# Patient Record
Sex: FEMALE | Race: WHITE | Employment: FULL TIME | URBAN - METROPOLITAN AREA
[De-identification: names, ages, dates, MRNs, and addresses within clinical notes are randomized per-mention and may not be internally consistent; named-entity substitution may affect disease eponyms.]

---

## 2017-04-04 ENCOUNTER — OFFICE VISIT (OUTPATIENT)
Dept: FAMILY MEDICINE CLINIC | Age: 27
End: 2017-04-04

## 2017-04-04 VITALS
OXYGEN SATURATION: 98 % | TEMPERATURE: 97.9 F | HEIGHT: 59 IN | DIASTOLIC BLOOD PRESSURE: 72 MMHG | BODY MASS INDEX: 31.45 KG/M2 | RESPIRATION RATE: 16 BRPM | HEART RATE: 86 BPM | SYSTOLIC BLOOD PRESSURE: 120 MMHG | WEIGHT: 156 LBS

## 2017-04-04 DIAGNOSIS — R11.0 NAUSEA: ICD-10-CM

## 2017-04-04 DIAGNOSIS — K29.70 GASTRITIS, PRESENCE OF BLEEDING UNSPECIFIED, UNSPECIFIED CHRONICITY, UNSPECIFIED GASTRITIS TYPE: ICD-10-CM

## 2017-04-04 DIAGNOSIS — R10.84 GENERALIZED ABDOMINAL PAIN: ICD-10-CM

## 2017-04-04 DIAGNOSIS — R10.84 GENERALIZED ABDOMINAL PAIN: Primary | ICD-10-CM

## 2017-04-04 LAB
A/G RATIO: 1.5 (ref 1.1–2.2)
ALBUMIN SERPL-MCNC: 4.4 G/DL (ref 3.4–5)
ALP BLD-CCNC: 66 U/L (ref 40–129)
ALT SERPL-CCNC: 9 U/L (ref 10–40)
ANION GAP SERPL CALCULATED.3IONS-SCNC: 15 MMOL/L (ref 3–16)
AST SERPL-CCNC: 13 U/L (ref 15–37)
BASOPHILS ABSOLUTE: 0.1 K/UL (ref 0–0.2)
BASOPHILS RELATIVE PERCENT: 1.2 %
BILIRUB SERPL-MCNC: <0.2 MG/DL (ref 0–1)
BUN BLDV-MCNC: 13 MG/DL (ref 7–20)
CALCIUM SERPL-MCNC: 9 MG/DL (ref 8.3–10.6)
CHLORIDE BLD-SCNC: 104 MMOL/L (ref 99–110)
CO2: 24 MMOL/L (ref 21–32)
CREAT SERPL-MCNC: 0.6 MG/DL (ref 0.6–1.1)
EOSINOPHILS ABSOLUTE: 0.2 K/UL (ref 0–0.6)
EOSINOPHILS RELATIVE PERCENT: 2.8 %
GFR AFRICAN AMERICAN: >60
GFR NON-AFRICAN AMERICAN: >60
GLOBULIN: 2.9 G/DL
GLUCOSE BLD-MCNC: 91 MG/DL (ref 70–99)
HCT VFR BLD CALC: 40.7 % (ref 36–48)
HEMOGLOBIN: 13.4 G/DL (ref 12–16)
LYMPHOCYTES ABSOLUTE: 2.3 K/UL (ref 1–5.1)
LYMPHOCYTES RELATIVE PERCENT: 31 %
MCH RBC QN AUTO: 27.6 PG (ref 26–34)
MCHC RBC AUTO-ENTMCNC: 32.9 G/DL (ref 31–36)
MCV RBC AUTO: 84.1 FL (ref 80–100)
MONOCYTES ABSOLUTE: 0.6 K/UL (ref 0–1.3)
MONOCYTES RELATIVE PERCENT: 8.2 %
NEUTROPHILS ABSOLUTE: 4.2 K/UL (ref 1.7–7.7)
NEUTROPHILS RELATIVE PERCENT: 56.8 %
PDW BLD-RTO: 12.7 % (ref 12.4–15.4)
PLATELET # BLD: 301 K/UL (ref 135–450)
PMV BLD AUTO: 8.3 FL (ref 5–10.5)
POTASSIUM SERPL-SCNC: 4.4 MMOL/L (ref 3.5–5.1)
RBC # BLD: 4.85 M/UL (ref 4–5.2)
SODIUM BLD-SCNC: 143 MMOL/L (ref 136–145)
TOTAL PROTEIN: 7.3 G/DL (ref 6.4–8.2)
WBC # BLD: 7.4 K/UL (ref 4–11)

## 2017-04-04 PROCEDURE — 99214 OFFICE O/P EST MOD 30 MIN: CPT | Performed by: NURSE PRACTITIONER

## 2017-04-04 RX ORDER — PANTOPRAZOLE SODIUM 40 MG/1
40 TABLET, DELAYED RELEASE ORAL DAILY
Qty: 30 TABLET | Refills: 1 | Status: SHIPPED | OUTPATIENT
Start: 2017-04-04 | End: 2017-06-05 | Stop reason: SDUPTHER

## 2017-04-04 ASSESSMENT — PATIENT HEALTH QUESTIONNAIRE - PHQ9
SUM OF ALL RESPONSES TO PHQ9 QUESTIONS 1 & 2: 0
SUM OF ALL RESPONSES TO PHQ QUESTIONS 1-9: 0
2. FEELING DOWN, DEPRESSED OR HOPELESS: 0
1. LITTLE INTEREST OR PLEASURE IN DOING THINGS: 0

## 2017-04-04 ASSESSMENT — ENCOUNTER SYMPTOMS
HEMATOCHEZIA: 0
SORE THROAT: 0
NAUSEA: 1
VOMITING: 0
EYES NEGATIVE: 1
BLOOD IN STOOL: 0
TROUBLE SWALLOWING: 0
RESPIRATORY NEGATIVE: 1
FLATUS: 1
ANAL BLEEDING: 0
ALLERGIC/IMMUNOLOGIC NEGATIVE: 1
DIARRHEA: 0
ABDOMINAL DISTENTION: 1
ABDOMINAL PAIN: 1
CONSTIPATION: 0
BELCHING: 0

## 2017-04-04 ASSESSMENT — CROHNS DISEASE ACTIVITY INDEX (CDAI): CDAI SCORE: 0

## 2017-04-07 ENCOUNTER — TELEPHONE (OUTPATIENT)
Dept: FAMILY MEDICINE CLINIC | Age: 27
End: 2017-04-07

## 2017-04-24 ENCOUNTER — OFFICE VISIT (OUTPATIENT)
Dept: FAMILY MEDICINE CLINIC | Age: 27
End: 2017-04-24

## 2017-04-24 VITALS
OXYGEN SATURATION: 99 % | SYSTOLIC BLOOD PRESSURE: 109 MMHG | WEIGHT: 155 LBS | DIASTOLIC BLOOD PRESSURE: 74 MMHG | HEIGHT: 59 IN | HEART RATE: 81 BPM | RESPIRATION RATE: 16 BRPM | TEMPERATURE: 99.1 F | BODY MASS INDEX: 31.25 KG/M2

## 2017-04-24 DIAGNOSIS — R10.33 PERIUMBILICAL ABDOMINAL PAIN: Primary | ICD-10-CM

## 2017-04-24 PROCEDURE — 99214 OFFICE O/P EST MOD 30 MIN: CPT | Performed by: NURSE PRACTITIONER

## 2017-04-24 ASSESSMENT — ENCOUNTER SYMPTOMS
FLATUS: 1
DIARRHEA: 0
BLOOD IN STOOL: 0
VOMITING: 0
RESPIRATORY NEGATIVE: 1
BELCHING: 1
ABDOMINAL PAIN: 1
CONSTIPATION: 0
HEMATOCHEZIA: 0
EYES NEGATIVE: 1
ALLERGIC/IMMUNOLOGIC NEGATIVE: 1
NAUSEA: 0

## 2017-04-24 ASSESSMENT — PATIENT HEALTH QUESTIONNAIRE - PHQ9
2. FEELING DOWN, DEPRESSED OR HOPELESS: 0
1. LITTLE INTEREST OR PLEASURE IN DOING THINGS: 0
SUM OF ALL RESPONSES TO PHQ9 QUESTIONS 1 & 2: 0
SUM OF ALL RESPONSES TO PHQ QUESTIONS 1-9: 0

## 2017-05-20 ENCOUNTER — HOSPITAL ENCOUNTER (OUTPATIENT)
Dept: ULTRASOUND IMAGING | Age: 27
Discharge: HOME | End: 2017-05-20
Attending: NURSE PRACTITIONER | Admitting: NURSE PRACTITIONER

## 2017-05-20 DIAGNOSIS — R10.33 PERIUMBILICAL ABDOMINAL PAIN: ICD-10-CM

## 2017-06-05 RX ORDER — PANTOPRAZOLE SODIUM 40 MG/1
TABLET, DELAYED RELEASE ORAL
Qty: 30 TABLET | Refills: 1 | Status: SHIPPED | OUTPATIENT
Start: 2017-06-05 | End: 2018-01-24 | Stop reason: ALTCHOICE

## 2017-07-07 ENCOUNTER — TELEPHONE (OUTPATIENT)
Dept: FAMILY MEDICINE CLINIC | Age: 27
End: 2017-07-07

## 2017-07-07 DIAGNOSIS — Z01.419 WELL WOMAN EXAM: Primary | ICD-10-CM

## 2017-08-14 RX ORDER — MONTELUKAST SODIUM 10 MG/1
TABLET ORAL
Qty: 30 TABLET | Refills: 3 | Status: SHIPPED | OUTPATIENT
Start: 2017-08-14 | End: 2019-08-30 | Stop reason: SDUPTHER

## 2017-08-16 ENCOUNTER — TELEPHONE (OUTPATIENT)
Dept: FAMILY MEDICINE CLINIC | Age: 27
End: 2017-08-16

## 2018-01-24 ENCOUNTER — OFFICE VISIT (OUTPATIENT)
Dept: FAMILY MEDICINE CLINIC | Age: 28
End: 2018-01-24

## 2018-01-24 VITALS
HEIGHT: 59 IN | OXYGEN SATURATION: 97 % | WEIGHT: 153 LBS | DIASTOLIC BLOOD PRESSURE: 73 MMHG | BODY MASS INDEX: 30.84 KG/M2 | HEART RATE: 92 BPM | SYSTOLIC BLOOD PRESSURE: 115 MMHG | RESPIRATION RATE: 16 BRPM | TEMPERATURE: 97.7 F

## 2018-01-24 DIAGNOSIS — K59.00 CONSTIPATION, UNSPECIFIED CONSTIPATION TYPE: ICD-10-CM

## 2018-01-24 DIAGNOSIS — G89.29 CHRONIC ABDOMINAL PAIN: Primary | ICD-10-CM

## 2018-01-24 DIAGNOSIS — R10.9 CHRONIC ABDOMINAL PAIN: Primary | ICD-10-CM

## 2018-01-24 PROCEDURE — 99213 OFFICE O/P EST LOW 20 MIN: CPT | Performed by: NURSE PRACTITIONER

## 2018-01-24 RX ORDER — DICYCLOMINE HYDROCHLORIDE 10 MG/1
CAPSULE ORAL
COMMUNITY
Start: 2018-01-11 | End: 2018-01-30 | Stop reason: SINTOL

## 2018-01-24 RX ORDER — POLYETHYLENE GLYCOL 3350 17 G/17G
17 POWDER, FOR SOLUTION ORAL DAILY
Qty: 510 G | Refills: 1 | Status: SHIPPED | OUTPATIENT
Start: 2018-01-24 | End: 2018-02-23

## 2018-01-24 RX ORDER — PANTOPRAZOLE SODIUM 40 MG/1
40 TABLET, DELAYED RELEASE ORAL DAILY
Qty: 30 TABLET | Refills: 3 | Status: SHIPPED | OUTPATIENT
Start: 2018-01-24 | End: 2018-01-30 | Stop reason: CLARIF

## 2018-01-24 ASSESSMENT — ENCOUNTER SYMPTOMS
VOMITING: 0
DIARRHEA: 1
CHEST TIGHTNESS: 0
COUGH: 0
EYES NEGATIVE: 1
SHORTNESS OF BREATH: 0
ABDOMINAL PAIN: 1
BLOOD IN STOOL: 0
CONSTIPATION: 1

## 2018-01-24 NOTE — PROGRESS NOTES
1/24/2018    This is a 28 y.o. female   Chief Complaint   Patient presents with    Abdominal Pain    Constipation   . Glena Barthel is here for follow-up on abbominal pain. The pain has been ongoing for over several month. TI has waxed and waned over that times. She has not been taking protonix since mid June. She continues to find no correlation to food. She has a negative US of her gallbladder. The pain is describes as periumbilical cramping. Was some sharpness at times. She denies fever or chills. She denies changes to bladder habits. She does note that she is having increased constipation however she is having daily watery/mucoid diarrhea in between bowel movements. She has tried over-the-counter fiber supplements and increasing her fiber intake to help with the constipation however this is not been effective. She has not seen the gastroenterologist as was indicated in April of this year. However, the pain has significantly worsened over the last month         There is no problem list on file for this patient. Current Outpatient Prescriptions   Medication Sig Dispense Refill    dicyclomine (BENTYL) 10 MG capsule       pantoprazole (PROTONIX) 40 MG tablet Take 1 tablet by mouth daily 30 tablet 3    polyethylene glycol (MIRALAX) powder Take 17 g by mouth daily 510 g 1    montelukast (SINGULAIR) 10 MG tablet TAKE 1 TABLET BY MOUTH NIGHTLY 30 tablet 3    albuterol sulfate HFA (PROVENTIL HFA) 108 (90 BASE) MCG/ACT inhaler Inhale 2 puffs into the lungs every 4 hours as needed for Wheezing 1 Inhaler 0     No current facility-administered medications for this visit. Allergies   Allergen Reactions    Pertussis Vaccines        /73 (Site: Left Arm, Position: Sitting, Cuff Size: Medium Adult)   Pulse 92   Temp 97.7 °F (36.5 °C) (Oral)   Resp 16   Ht 4' 11\" (1.499 m)   Wt 153 lb (69.4 kg)   LMP 01/13/2018 (Exact Date)   SpO2 97%   Breastfeeding?  No   BMI 30.90 kg/m²     Social History pallor. Psychiatric: She has a normal mood and affect. Her behavior is normal. Judgment and thought content normal.   Nursing note and vitals reviewed. Diagnosis    ICD-10-CM ICD-9-CM    1. Chronic abdominal pain R10.9 789.00 CT ABDOMEN W CONTRAST    G89.29 338.29 Beverly Lewis MD   2. Constipation, unspecified constipation type K59.00 564.00 Beverly Lewis MD        Plan    Ct abdomen- consider divertiuclosis, low likelihood of appy  Referral to GI-  Restart protonix  Continue bentyl  Start miraalax for constipation      Orders Placed This Encounter   Procedures    CT ABDOMEN W CONTRAST     Standing Status:   Future     Standing Expiration Date:   1/24/2019     Order Specific Question:   Additional Contrast?     Answer:   Oral     Order Specific Question:   Reason for exam:     Answer:   chonic abdominal pain with negative abdominal US   Beverly Lewis MD     Referral Priority:   Routine     Referral Type:   Consult for Advice and Opinion     Referral Reason:   Specialty Services Required     Referred to Provider:   Marielle Munoz MD     Requested Specialty:   Gastroenterology     Number of Visits Requested:   1       Orders Placed This Encounter   Medications    pantoprazole (PROTONIX) 40 MG tablet     Sig: Take 1 tablet by mouth daily     Dispense:  30 tablet     Refill:  3    polyethylene glycol (MIRALAX) powder     Sig: Take 17 g by mouth daily     Dispense:  510 g     Refill:  1         Return in about 2 weeks (around 2/7/2018).

## 2018-01-30 ENCOUNTER — INITIAL CONSULT (OUTPATIENT)
Dept: GASTROENTEROLOGY | Age: 28
End: 2018-01-30

## 2018-01-30 VITALS
SYSTOLIC BLOOD PRESSURE: 124 MMHG | DIASTOLIC BLOOD PRESSURE: 70 MMHG | WEIGHT: 156 LBS | HEIGHT: 59 IN | BODY MASS INDEX: 31.45 KG/M2

## 2018-01-30 DIAGNOSIS — R19.4 BOWEL HABIT CHANGES: ICD-10-CM

## 2018-01-30 DIAGNOSIS — R10.33 PERIUMBILICAL ABDOMINAL PAIN: ICD-10-CM

## 2018-01-30 DIAGNOSIS — Z83.79 FAMILY HISTORY OF CROHN'S DISEASE: Primary | ICD-10-CM

## 2018-01-30 PROCEDURE — 99204 OFFICE O/P NEW MOD 45 MIN: CPT | Performed by: INTERNAL MEDICINE

## 2018-01-30 NOTE — PATIENT INSTRUCTIONS
contribute to anxiety or stress and learn how to change these thoughts. Participation in a support group can also be valuable. Many patients find that daily exercise is helpful in maintaining a sense of well-being. Exercise can also have favorable effects on the bowels. Irritable bowel syndrome medications - Although many drugs are available to treat the symptoms of irritable bowel syndrome, these drugs do not cure the condition. They are primarily used to relieve symptoms. The choice among these medications depends in part upon whether you have diarrhea, constipation, or pain- predominant irritable bowel syndrome. As a general rule, medications are reserved for people whose symptoms have not adequately responded to more conservative measures such as changes in diet and fiber supplements. Anticholinergic medications - Anticholinergic drugs block the nervous system's stimulation of the gastrointestinal tract, helping to reduce severe cramping and irregular contractions of the colon. Drugs in this category include dicyclomine (Bentyl®) and hyoscyamine (Levsin®). These drugs may be particularly helpful when taken preventively (ie, before symptoms) and thus are most helpful if you can predict the onset of your symptoms. Common side-effects include dry mouth and eyes and blurred vision. Antidepressants - Many tricyclic antidepressants (TCAs) have a pain relieving effect in people with irritable bowel syndrome. The dose of TCAs is typically much lower than that used for treating depression. It is believed that these drugs reduce pain perception when used in low doses, although the exact mechanism of their benefit is unknown. TCAs commonly used for pain management include amitriptyline, desipramine, and nortriptyline. It is common to experience fatigue when starting a TCA; this is not always an undesirable side effect since it can help improve sleep when TCAs are taken in the evening.  TCAs are generally started in low doses and increased gradually. Their full effect may not be seen for three to four weeks. TCAs also slow movement of contents through the gastrointestinal tract and may be most helpful in people with diarrhea-predominant irritable bowel syndrome. Another class of antidepressants, the selective serotonin reuptake inhibitors, may be recommended if you have both irritable bowel syndrome and depression. Common SSRIs include fluoxetine (Prozac®), sertraline (Zoloft®), paroxetine (Paxil®), citalopram (Celexa®), and escitalopram (Lexapro®) Other antidepressant medications that may be recommended include mirtazapine (Remeron®), venlafaxine (Effexor®), and duloxetine (Cymbalta®). Antidiarrheal drugs - The drugs loperamide (Imodium®) or diphenoxylate with atropine (Lomotil®) can help slow the movement of stool through the digestive tract. Loperamide and diphenoxylate/atropine are most helpful if you have diarrhea-predominant irritable bowel syndrome. However, clinicians usually recommend that these drugs should only be used as needed rather than on a continuous basis. Anti-anxiety drugs - Anti-anxiety drugs reduce anxiety. Diazepam (Valium®), lorazepam (Ativan®), and clonazepam (Klonopin®) belong to this class of drugs. Anti-anxiety drugs are occasionally prescribed for people with short-term anxiety that is worsening their irritable bowel syndrome symptoms. However, these drugs should only be taken for short periods of time since they can be addictive. Alosetron - Alosetron (Lotronex®) blocks a hormone that is involved in intestinal contractions and sensations. It is approved to treat women with irritable bowel syndrome whose predominant symptom is diarrhea. However, it was withdrawn from the market soon after its introduction because of concerns related to safety. It was reintroduced and is currently available, although certain prescribing guidelines must be followed.  Further information is available on the 's web site (www.[email or web address]). Lubiprostone - Lubiprostone (Amitiza®) is available for treatment of severe constipation and irritable bowel syndrome in women over 18 years who have not responded to other treatments. It works by increasing intestinal fluid secretion. It is expensive compared to other agents. Further testing is needed to clarify the effectiveness and long-term safety of lubiprostone. Antibiotics - The role of antibiotics in the treatment of irritable bowel syndrome remains unclear. There appear to be some patients whose irritable bowel syndrome symptoms are due to overgrowth of bacteria in the intestines and who benefit from antibiotic treatment. However, more research is needed before antibiotics are recommended for treatment of irritable bowel syndrome. HERBS AND NATURAL THERAPIES FOR IRRITABLE BOWEL SYNDROME - A number of herbal and natural therapies have been advertised (especially on the internet) for the treatment of irritable bowel syndrome. Unfortunately, there is no evidence supporting their benefit. Although small studies may support some of these therapies, the studies are either too small or have major flaws that make definitive conclusions impossible. Peppermint oil - There is some evidence supporting the use of peppermint oil, although it is difficult to make definitive conclusions. Peppermint oil can cause or worsen heartburn. Acidophilus - There is increasing interest in the possible beneficial effects of \"healthy\" bacteria (probiotics) in a variety of intestinal diseases including IBS. Whether supplements containing these bacteria are of any benefit is unproven. Unproven - Chamomile tea is of unproven benefit in irritable bowel syndrome. Furthermore, chamomile can aggravate allergies in people who tend to be allergic to grasses. Evening primrose oil, a supplement containing gamma linolenic acid, is of unproven benefit. Fennel seeds are of unproven benefit. of IBS. (See 'Irritable bowel syndrome diagnosis' above.)   There are many different treatments available to relieve the symptoms of irritable bowel syndrome; these include the monitoring of symptoms and patterns, adjustment of the diet to increase fiber and eliminate foods that can worsen symptoms, psychosocial therapy (since stress may aggravate IBS), and medication. Treatments are often used in combination, and because of the variability of symptoms, different treatments work for different people. (See 'Irritable bowel syndrome treatment' above.)   Many herbal and natural therapies have been advertised for the treatment of irritable bowel syndrome; however, these therapies have not been proven effective and they are not recommended. (See 'Herbs and natural therapies for irritable bowel syndrome' above.)   Although irritable bowel syndrome can cause pain and stress, the majority of patients are able to control their symptoms and live a normal life without developing serious health problems. (See 'Irritable bowel syndrome prognosis' above.)  WHERE TO GET MORE INFORMATION - Your healthcare provider is the best source of information for questions and concerns related to your medical problem. This article will be updated as needed every four months on our Web site ([email or web address]).

## 2018-01-30 NOTE — PROGRESS NOTES
Mahnaz Colvin [facility]    [address],  [address] [facility], [facility]  Phone: [phone] COMPLAINT     Chief Complaint   Patient presents with   [address]     NP- abd pain, chest pain, constipation      HPI     Thank you Juanito Mboley DO for asking me to see Asia Paredes in consultation. She is a Single [1] White [1] 28 y.o. Mahnaz Colvin female seen independently who presents with the following GI complaints: Deborah Hunt  Complains of recurrent periumbilical pain for the past month better the last few days. She states she is gaining weight. There is associated bloating and constipation. She went as long as a week without a BM and may strain or sit a long time and only liquid comes out. Denies bleeding. States her father has Crohn's and has had a colostomy. No asa or nsaid use. Bought some otc miralax but not taking it daily. Bentyl gave her chest pain. Denies heart burn and did not fill rx for protonix. RUQ ultrasound showed a left sided liver hemangioma. Last Encounter Reviewed:   Pertinent PMH, FH, SH is reviewed below. Last EGD: none  Last Colonoscopy: none    No components found for: HGBA1C  BP Readings from Last 3 Encounters:   01/30/18 124/70   01/24/18 115/73   04/24/17 109/74     Health Maintenance   Topic Date Due    HIV screen  03/07/2004    Flu vaccine (1) 09/01/2017    Cervical cancer screen  07/16/2020    DTaP/Tdap/Td vaccine (2 - Td) 03/03/2025       No components found for: [facility]     PAST MEDICAL HISTORY     Past Medical History:   Diagnosis Date    Asthma     asthmatic bronchitis treatment.   not dx asthma    Autism     Generalized headaches      FAMILY HISTORY     Family History   Problem Relation Age of Onset    Diabetes Mother     Diabetes Father     Heart Disease Maternal Grandmother     Cancer Maternal Grandfather      colon cancer    Heart Disease Maternal Grandfather      SOCIAL HISTORY     Social History

## 2018-01-31 ENCOUNTER — TELEPHONE (OUTPATIENT)
Dept: GASTROENTEROLOGY | Age: 28
End: 2018-01-31

## 2018-02-01 ENCOUNTER — TELEPHONE (OUTPATIENT)
Dept: FAMILY MEDICINE CLINIC | Age: 28
End: 2018-02-01

## 2018-02-01 NOTE — TELEPHONE ENCOUNTER
Patient is requesting that Ta Guerra call her stated that she has questions regarding X-ray and ultra sound that were done  Patient stated that she starts work at 2 pm and needs a call before then ok to leave message .

## 2018-02-03 ENCOUNTER — HOSPITAL ENCOUNTER (OUTPATIENT)
Dept: OTHER | Age: 28
Discharge: HOME | End: 2018-02-03
Attending: INTERNAL MEDICINE | Admitting: INTERNAL MEDICINE

## 2018-02-03 LAB
ANION GAP SERPL CALCULATED.3IONS-SCNC: 10 MMOL/L (ref 3–16)
BUN BLDV-MCNC: 16 MG/DL (ref 7–20)
CALCIUM SERPL-MCNC: 9.2 MG/DL (ref 8.3–10.6)
CHLORIDE BLD-SCNC: 106 MMOL/L (ref 99–110)
CO2: 25 MMOL/L (ref 21–32)
CREAT SERPL-MCNC: 0.6 MG/DL (ref 0.6–1.1)
GFR AFRICAN AMERICAN: >60
GFR NON-AFRICAN AMERICAN: >60
GLUCOSE BLD-MCNC: 89 MG/DL (ref 70–99)
POTASSIUM SERPL-SCNC: 4.5 MMOL/L (ref 3.5–5.1)
SODIUM BLD-SCNC: 141 MMOL/L (ref 136–145)

## 2018-02-05 LAB — CALPROTECTIN: 24 UG/G

## 2018-02-06 NOTE — PROGRESS NOTES
Please call patient with normal stool fecal calprotectin arguing against an inflammatory condition in the intestines. CT is scheduled for 2 days. Will call with results.

## 2018-02-07 ENCOUNTER — HOSPITAL ENCOUNTER (OUTPATIENT)
Dept: CT IMAGING | Age: 28
Discharge: HOME | End: 2018-02-07
Attending: INTERNAL MEDICINE | Admitting: INTERNAL MEDICINE

## 2018-02-07 DIAGNOSIS — Z83.79 FAMILY HISTORY OF CROHN'S DISEASE: ICD-10-CM

## 2018-02-07 DIAGNOSIS — R10.33 PERIUMBILICAL ABDOMINAL PAIN: ICD-10-CM

## 2018-02-07 DIAGNOSIS — R19.4 CHANGE IN BOWEL HABITS: ICD-10-CM

## 2018-02-07 DIAGNOSIS — R19.4 BOWEL HABIT CHANGES: ICD-10-CM

## 2018-03-28 ENCOUNTER — TELEPHONE (OUTPATIENT)
Dept: FAMILY MEDICINE CLINIC | Age: 28
End: 2018-03-28

## 2018-03-28 NOTE — TELEPHONE ENCOUNTER
Patient would like you to look over her CT Results from FEB. She would like to make sure that her gastro did not miss anything.

## 2018-03-29 NOTE — TELEPHONE ENCOUNTER
I attempted to call the patient to discuss her test results. Phone number was not identifiable. LM to call back. I agree with the read/plan by Dr Esther Coronel.  Please make a follow-up with him for further work up/management of your symptoms

## 2018-04-05 ENCOUNTER — OFFICE VISIT (OUTPATIENT)
Dept: FAMILY MEDICINE CLINIC | Age: 28
End: 2018-04-05

## 2018-04-05 VITALS
DIASTOLIC BLOOD PRESSURE: 82 MMHG | SYSTOLIC BLOOD PRESSURE: 126 MMHG | HEIGHT: 59 IN | OXYGEN SATURATION: 98 % | HEART RATE: 59 BPM | WEIGHT: 153 LBS | BODY MASS INDEX: 30.84 KG/M2

## 2018-04-05 DIAGNOSIS — K59.00 CONSTIPATION, UNSPECIFIED CONSTIPATION TYPE: ICD-10-CM

## 2018-04-05 DIAGNOSIS — R10.13 EPIGASTRIC PAIN: Primary | ICD-10-CM

## 2018-04-05 DIAGNOSIS — R10.13 EPIGASTRIC PAIN: ICD-10-CM

## 2018-04-05 LAB
ALBUMIN SERPL-MCNC: 4.1 G/DL (ref 3.4–5)
ALP BLD-CCNC: 64 U/L (ref 40–129)
ALT SERPL-CCNC: 10 U/L (ref 10–40)
AST SERPL-CCNC: 15 U/L (ref 15–37)
BASOPHILS ABSOLUTE: 0.1 K/UL (ref 0–0.2)
BASOPHILS RELATIVE PERCENT: 1.2 %
BILIRUB SERPL-MCNC: 0.4 MG/DL (ref 0–1)
BILIRUBIN DIRECT: <0.2 MG/DL (ref 0–0.3)
BILIRUBIN, INDIRECT: NORMAL MG/DL (ref 0–1)
EOSINOPHILS ABSOLUTE: 0.2 K/UL (ref 0–0.6)
EOSINOPHILS RELATIVE PERCENT: 3.2 %
HCT VFR BLD CALC: 41.2 % (ref 36–48)
HEMOGLOBIN: 13.7 G/DL (ref 12–16)
LYMPHOCYTES ABSOLUTE: 2.4 K/UL (ref 1–5.1)
LYMPHOCYTES RELATIVE PERCENT: 30.7 %
MCH RBC QN AUTO: 28.5 PG (ref 26–34)
MCHC RBC AUTO-ENTMCNC: 33.1 G/DL (ref 31–36)
MCV RBC AUTO: 86.1 FL (ref 80–100)
MONOCYTES ABSOLUTE: 0.6 K/UL (ref 0–1.3)
MONOCYTES RELATIVE PERCENT: 7.7 %
NEUTROPHILS ABSOLUTE: 4.4 K/UL (ref 1.7–7.7)
NEUTROPHILS RELATIVE PERCENT: 57.2 %
PDW BLD-RTO: 13 % (ref 12.4–15.4)
PLATELET # BLD: 303 K/UL (ref 135–450)
PMV BLD AUTO: 8.6 FL (ref 5–10.5)
RBC # BLD: 4.79 M/UL (ref 4–5.2)
TOTAL PROTEIN: 7 G/DL (ref 6.4–8.2)
TSH REFLEX: 3.83 UIU/ML (ref 0.27–4.2)
WBC # BLD: 7.7 K/UL (ref 4–11)

## 2018-04-05 PROCEDURE — 99213 OFFICE O/P EST LOW 20 MIN: CPT | Performed by: FAMILY MEDICINE

## 2018-04-05 ASSESSMENT — ENCOUNTER SYMPTOMS
ABDOMINAL PAIN: 1
DIARRHEA: 0
CONSTIPATION: 1
TROUBLE SWALLOWING: 0
BLOOD IN STOOL: 0

## 2018-04-09 ENCOUNTER — TELEPHONE (OUTPATIENT)
Dept: FAMILY MEDICINE CLINIC | Age: 28
End: 2018-04-09

## 2018-04-12 ENCOUNTER — OFFICE VISIT (OUTPATIENT)
Dept: GASTROENTEROLOGY | Age: 28
End: 2018-04-12

## 2018-04-12 VITALS
BODY MASS INDEX: 30.24 KG/M2 | WEIGHT: 150 LBS | SYSTOLIC BLOOD PRESSURE: 110 MMHG | DIASTOLIC BLOOD PRESSURE: 60 MMHG | HEIGHT: 59 IN

## 2018-04-12 DIAGNOSIS — R10.11 RUQ PAIN: Primary | ICD-10-CM

## 2018-04-12 DIAGNOSIS — R10.13 DYSPEPSIA: ICD-10-CM

## 2018-04-12 PROCEDURE — 99213 OFFICE O/P EST LOW 20 MIN: CPT | Performed by: INTERNAL MEDICINE

## 2018-04-13 ENCOUNTER — TELEPHONE (OUTPATIENT)
Dept: FAMILY MEDICINE CLINIC | Age: 28
End: 2018-04-13

## 2018-04-13 ENCOUNTER — HOSPITAL ENCOUNTER (OUTPATIENT)
Dept: OTHER | Age: 28
Discharge: HOME | End: 2018-04-13
Attending: INTERNAL MEDICINE | Admitting: INTERNAL MEDICINE

## 2018-04-13 VITALS
SYSTOLIC BLOOD PRESSURE: 123 MMHG | HEART RATE: 95 BPM | TEMPERATURE: 98.1 F | DIASTOLIC BLOOD PRESSURE: 75 MMHG | RESPIRATION RATE: 16 BRPM | OXYGEN SATURATION: 99 % | BODY MASS INDEX: 30.24 KG/M2 | WEIGHT: 150 LBS | HEIGHT: 59 IN

## 2018-04-13 DIAGNOSIS — R10.11 RUQ PAIN: Primary | ICD-10-CM

## 2018-04-13 LAB — PREGNANCY, URINE: NEGATIVE

## 2018-04-13 PROCEDURE — 99152 MOD SED SAME PHYS/QHP 5/>YRS: CPT | Performed by: INTERNAL MEDICINE

## 2018-04-13 PROCEDURE — 43239 EGD BIOPSY SINGLE/MULTIPLE: CPT | Performed by: INTERNAL MEDICINE

## 2018-04-13 RX ORDER — SODIUM CHLORIDE, SODIUM LACTATE, POTASSIUM CHLORIDE, CALCIUM CHLORIDE 600; 310; 30; 20 MG/100ML; MG/100ML; MG/100ML; MG/100ML
INJECTION, SOLUTION INTRAVENOUS ONCE
Status: COMPLETED | OUTPATIENT
Start: 2018-04-13 | End: 2018-04-13

## 2018-04-13 RX ORDER — OMEPRAZOLE 40 MG/1
40 CAPSULE, DELAYED RELEASE ORAL DAILY
Qty: 30 CAPSULE | Refills: 1 | Status: SHIPPED | OUTPATIENT
Start: 2018-04-13 | End: 2018-06-07 | Stop reason: SDUPTHER

## 2018-04-13 RX ADMIN — SODIUM CHLORIDE, SODIUM LACTATE, POTASSIUM CHLORIDE, CALCIUM CHLORIDE: 600; 310; 30; 20 INJECTION, SOLUTION INTRAVENOUS at 11:44

## 2018-04-13 ASSESSMENT — PAIN - FUNCTIONAL ASSESSMENT: PAIN_FUNCTIONAL_ASSESSMENT: 0-10

## 2018-04-13 ASSESSMENT — PAIN DESCRIPTION - DESCRIPTORS: DESCRIPTORS: DULL;SHARP

## 2018-04-17 ENCOUNTER — TELEPHONE (OUTPATIENT)
Dept: FAMILY MEDICINE CLINIC | Age: 28
End: 2018-04-17

## 2018-06-07 DIAGNOSIS — R10.13 EPIGASTRIC PAIN: Primary | ICD-10-CM

## 2018-06-08 RX ORDER — OMEPRAZOLE 40 MG/1
40 CAPSULE, DELAYED RELEASE ORAL DAILY
Qty: 30 CAPSULE | Refills: 1 | Status: SHIPPED | OUTPATIENT
Start: 2018-06-08 | End: 2020-02-17 | Stop reason: ALTCHOICE

## 2018-10-29 ENCOUNTER — APPOINTMENT (OUTPATIENT)
Dept: GENERAL RADIOLOGY | Age: 28
End: 2018-10-29
Payer: COMMERCIAL

## 2018-10-29 ENCOUNTER — HOSPITAL ENCOUNTER (EMERGENCY)
Age: 28
Discharge: HOME | End: 2018-10-29
Payer: COMMERCIAL

## 2018-10-29 VITALS
SYSTOLIC BLOOD PRESSURE: 122 MMHG | BODY MASS INDEX: 30.3 KG/M2 | DIASTOLIC BLOOD PRESSURE: 69 MMHG | HEART RATE: 98 BPM | RESPIRATION RATE: 16 BRPM | TEMPERATURE: 98.3 F | OXYGEN SATURATION: 98 % | WEIGHT: 150 LBS

## 2018-10-29 DIAGNOSIS — M79.605 LEFT LEG PAIN: Primary | ICD-10-CM

## 2018-10-29 PROCEDURE — 99283 EMERGENCY DEPT VISIT LOW MDM: CPT

## 2018-10-29 PROCEDURE — 6370000000 HC RX 637 (ALT 250 FOR IP): Performed by: NURSE PRACTITIONER

## 2018-10-29 PROCEDURE — 73562 X-RAY EXAM OF KNEE 3: CPT

## 2018-10-29 PROCEDURE — 93971 EXTREMITY STUDY: CPT

## 2018-10-29 RX ORDER — METHOCARBAMOL 750 MG/1
750 TABLET, FILM COATED ORAL 4 TIMES DAILY
Qty: 40 TABLET | Refills: 0 | Status: SHIPPED | OUTPATIENT
Start: 2018-10-29 | End: 2018-11-08

## 2018-10-29 RX ORDER — IBUPROFEN 800 MG/1
800 TABLET ORAL EVERY 6 HOURS PRN
Qty: 30 TABLET | Refills: 0 | Status: SHIPPED | OUTPATIENT
Start: 2018-10-29 | End: 2021-07-29

## 2018-10-29 RX ORDER — ACETAMINOPHEN 500 MG
1000 TABLET ORAL ONCE
Status: COMPLETED | OUTPATIENT
Start: 2018-10-29 | End: 2018-10-29

## 2018-10-29 RX ADMIN — ACETAMINOPHEN 1000 MG: 500 TABLET ORAL at 10:17

## 2018-10-29 ASSESSMENT — PAIN SCALES - GENERAL
PAINLEVEL_OUTOF10: 9
PAINLEVEL_OUTOF10: 10
PAINLEVEL_OUTOF10: 6

## 2018-10-29 NOTE — ED PROVIDER NOTES
Impressions Right Impression 1. There is complete compressibility of the common femoral vein. 2. There is normal spontaneous and phasic flow in the common femoral vein. Left Impression 1. There is complete compressibility of all deep and superficial veins throughout the lower extremity. 2. There is normal spontaneous and phasic flow throughout the lower extremity. Conclusions   Summary   1. There is no evidence of deep or superficial venous thrombosis involving  the left lower extremity or the right common femoral vein. 2. There is no evidence of a Baker's cyst in the left lower extremity. Signature   ------------------------------------------------------------------  Electronically signed by Reid Villarreal MD, Sky Camarena  (Interpreting physician) on 10/29/2018 at 11:04 AM  ------------------------------------------------------------------  Patient Status:ER. Study [facility] - Vascular Lab. Technical Quality:Adequate visualization.   - Results were reported to:Copy of prelim sent to ED. Risk Factors History +---------+----------+------------------------------------------------+ ! Diagnosis! Date      ! Comments                                        ! +---------+----------+------------------------------------------------+ ! Other    !10/29/2018! Left anterior knee and calf pain for past 4 days! +---------+----------+------------------------------------------------+   - The patient's risk factor(s) include: obesity.   - The patient has a former tobacco history. Velocities are measured in cm/s ; Diameters are measured in mm Right Lower Extremities DVT Study Measurements Right 2D Measurements +------------------------+----------+---------------+----------+ ! Location                ! Visualized! Compressibility! Thrombosis! +------------------------+----------+---------------+----------+ ! Sapheno Femoral Junction! Yes       ! Yes            ! None      ! applied, patient remained neurovascularly intact after application  Patient instructed on RICE therapy for home treatment. A discussion was had with Mrs. Hunt regarding ED findings. All questions were answered. Patient will follow up with  PCP and [facility] in 1-2 days for further evaluation/treatment. Patient will return to ED for new/worsening symptoms. Patient comfortable upon discharge. Return precautions were discussed in detail with patient. Patient agreeable with plan of care, treatment, and discharge at this time. Patient was sent home with a prescription for naproxen and Robaxin. MDM  I estimate there is LOW risk for COMPARTMENT SYNDROME, DEEP VENOUS THROMBOSIS, SEPTIC ARTHRITIS, TENDON OR NEUROVASCULAR INJURY, thus I consider the discharge disposition reasonable. Prashant Rizvi and I have discussed the diagnosis and risks, and we agree with discharging home to follow-up with their primary doctor or the referral orthopedist. We also discussed returning to the Emergency Department immediately if new or worsening symptoms occur. We have discussed the symptoms which are most concerning (e.g., changing or worsening pain, numbness, weakness) that necessitate immediate return. Final Impression    1. Left leg pain        Blood pressure 122/69, pulse 98, temperature 98.3 °F (36.8 °C), temperature source Oral, resp. rate 16, weight 150 lb (68 kg), SpO2 98 %, not currently breastfeeding. DISPOSITION  Patient was discharged to home in good condition.           Ira Garcia, SUZETTE - CNP  10/29/18 [id number]

## 2019-08-30 ENCOUNTER — OFFICE VISIT (OUTPATIENT)
Dept: FAMILY MEDICINE CLINIC | Age: 29
End: 2019-08-30
Payer: COMMERCIAL

## 2019-08-30 VITALS
WEIGHT: 160 LBS | HEART RATE: 97 BPM | BODY MASS INDEX: 32.25 KG/M2 | RESPIRATION RATE: 18 BRPM | SYSTOLIC BLOOD PRESSURE: 128 MMHG | HEIGHT: 59 IN | DIASTOLIC BLOOD PRESSURE: 84 MMHG | OXYGEN SATURATION: 98 %

## 2019-08-30 DIAGNOSIS — R30.0 DYSURIA: Primary | ICD-10-CM

## 2019-08-30 DIAGNOSIS — R10.2 VAGINAL PAIN: ICD-10-CM

## 2019-08-30 LAB
BILIRUBIN, POC: NORMAL
BLOOD URINE, POC: NORMAL
CLARITY, POC: NORMAL
COLOR, POC: NORMAL
GLUCOSE URINE, POC: NORMAL
KETONES, POC: NORMAL
LEUKOCYTE EST, POC: NORMAL
NITRITE, POC: NORMAL
PH, POC: 6
PROTEIN, POC: NORMAL
SPECIFIC GRAVITY, POC: 1.02
UROBILINOGEN, POC: NORMAL

## 2019-08-30 PROCEDURE — 99213 OFFICE O/P EST LOW 20 MIN: CPT | Performed by: NURSE PRACTITIONER

## 2019-08-30 PROCEDURE — 81002 URINALYSIS NONAUTO W/O SCOPE: CPT | Performed by: NURSE PRACTITIONER

## 2019-08-30 RX ORDER — MONTELUKAST SODIUM 10 MG/1
TABLET ORAL
Qty: 30 TABLET | Refills: 3 | Status: SHIPPED | OUTPATIENT
Start: 2019-08-30 | End: 2020-02-17 | Stop reason: SDUPTHER

## 2019-08-30 RX ORDER — SULFAMETHOXAZOLE AND TRIMETHOPRIM 800; 160 MG/1; MG/1
1 TABLET ORAL 2 TIMES DAILY
Qty: 14 TABLET | Refills: 0 | Status: SHIPPED | OUTPATIENT
Start: 2019-08-30 | End: 2019-09-06

## 2019-08-30 RX ORDER — PHENAZOPYRIDINE HYDROCHLORIDE 200 MG/1
200 TABLET, FILM COATED ORAL 3 TIMES DAILY PRN
Qty: 10 TABLET | Refills: 0 | Status: SHIPPED | OUTPATIENT
Start: 2019-08-30 | End: 2019-09-02

## 2019-08-30 ASSESSMENT — ENCOUNTER SYMPTOMS
NAUSEA: 0
DIARRHEA: 0
SORE THROAT: 0
CONSTIPATION: 0
ABDOMINAL PAIN: 0
VOMITING: 0

## 2019-08-30 ASSESSMENT — PATIENT HEALTH QUESTIONNAIRE - PHQ9
SUM OF ALL RESPONSES TO PHQ QUESTIONS 1-9: 0
SUM OF ALL RESPONSES TO PHQ QUESTIONS 1-9: 0
SUM OF ALL RESPONSES TO PHQ9 QUESTIONS 1 & 2: 0
1. LITTLE INTEREST OR PLEASURE IN DOING THINGS: 0
2. FEELING DOWN, DEPRESSED OR HOPELESS: 0

## 2019-08-31 LAB
CANDIDA SPECIES, DNA PROBE: ABNORMAL
GARDNERELLA VAGINALIS, DNA PROBE: ABNORMAL
TRICHOMONAS VAGINALIS DNA: ABNORMAL

## 2019-09-01 LAB — URINE CULTURE, ROUTINE: NORMAL

## 2019-09-04 RX ORDER — FLUCONAZOLE 150 MG/1
150 TABLET ORAL
Qty: 2 TABLET | Refills: 0 | Status: SHIPPED | OUTPATIENT
Start: 2019-09-04 | End: 2019-09-10

## 2020-02-17 ENCOUNTER — OFFICE VISIT (OUTPATIENT)
Dept: FAMILY MEDICINE CLINIC | Age: 30
End: 2020-02-17
Payer: COMMERCIAL

## 2020-02-17 VITALS
BODY MASS INDEX: 30.7 KG/M2 | TEMPERATURE: 98.4 F | DIASTOLIC BLOOD PRESSURE: 74 MMHG | WEIGHT: 152 LBS | OXYGEN SATURATION: 98 % | SYSTOLIC BLOOD PRESSURE: 110 MMHG | RESPIRATION RATE: 16 BRPM | HEART RATE: 91 BPM

## 2020-02-17 PROCEDURE — 99395 PREV VISIT EST AGE 18-39: CPT | Performed by: NURSE PRACTITIONER

## 2020-02-17 RX ORDER — MONTELUKAST SODIUM 10 MG/1
TABLET ORAL
Qty: 30 TABLET | Refills: 3 | Status: SHIPPED | OUTPATIENT
Start: 2020-02-17 | End: 2020-06-09

## 2020-02-17 RX ORDER — NORGESTIMATE AND ETHINYL ESTRADIOL 0.25-0.035
1 KIT ORAL DAILY
Qty: 1 PACKET | Refills: 11 | Status: SHIPPED | OUTPATIENT
Start: 2020-02-17 | End: 2020-07-13

## 2020-02-17 RX ORDER — ALBUTEROL SULFATE 90 UG/1
2 AEROSOL, METERED RESPIRATORY (INHALATION) EVERY 4 HOURS PRN
Qty: 1 INHALER | Refills: 0 | Status: SHIPPED | OUTPATIENT
Start: 2020-02-17

## 2020-02-17 RX ORDER — AMOXICILLIN AND CLAVULANATE POTASSIUM 875; 125 MG/1; MG/1
1 TABLET, FILM COATED ORAL 2 TIMES DAILY
Qty: 20 TABLET | Refills: 0 | Status: SHIPPED | OUTPATIENT
Start: 2020-02-17 | End: 2020-02-27

## 2020-02-17 ASSESSMENT — ENCOUNTER SYMPTOMS
SORE THROAT: 0
NAUSEA: 0
COUGH: 1
CHEST TIGHTNESS: 0
EYE REDNESS: 0
CONSTIPATION: 0
DIARRHEA: 0
WHEEZING: 0
COLOR CHANGE: 0
SINUS PRESSURE: 0
TROUBLE SWALLOWING: 0
RHINORRHEA: 1
CHOKING: 0
EYE ITCHING: 0
ABDOMINAL PAIN: 0
SHORTNESS OF BREATH: 0

## 2020-02-17 ASSESSMENT — PATIENT HEALTH QUESTIONNAIRE - PHQ9
SUM OF ALL RESPONSES TO PHQ QUESTIONS 1-9: 0
1. LITTLE INTEREST OR PLEASURE IN DOING THINGS: 0
SUM OF ALL RESPONSES TO PHQ QUESTIONS 1-9: 0
SUM OF ALL RESPONSES TO PHQ9 QUESTIONS 1 & 2: 0
2. FEELING DOWN, DEPRESSED OR HOPELESS: 0

## 2020-02-17 NOTE — PROGRESS NOTES
for cough. Negative for choking, chest tightness, shortness of breath and wheezing. Cardiovascular: Negative for chest pain, palpitations and leg swelling. Gastrointestinal: Negative for abdominal pain, constipation, diarrhea and nausea. Endocrine: Negative for cold intolerance, heat intolerance, polydipsia, polyphagia and polyuria. Genitourinary: Negative for dysuria, frequency and urgency. Musculoskeletal: Negative for arthralgias, joint swelling and myalgias. Skin: Negative for color change, pallor and rash. Allergic/Immunologic: Positive for environmental allergies. Negative for food allergies and immunocompromised state. Neurological: Positive for headaches. Negative for dizziness, syncope and weakness. Hematological: Does not bruise/bleed easily. Psychiatric/Behavioral: Negative for behavioral problems, hallucinations and sleep disturbance. The patient is not nervous/anxious. Physical Exam  Vitals signs and nursing note reviewed. Constitutional:       General: She is not in acute distress. Appearance: She is well-developed. She is not diaphoretic. HENT:      Head: Normocephalic and atraumatic. Right Ear: Hearing, ear canal and external ear normal. A middle ear effusion is present. Tympanic membrane is injected and bulging. Left Ear: Hearing, ear canal and external ear normal. A middle ear effusion is present. Tympanic membrane is erythematous and bulging. Nose: Mucosal edema and congestion present. Right Sinus: Maxillary sinus tenderness present. Left Sinus: Maxillary sinus tenderness present. Mouth/Throat:      Lips: Pink. Mouth: Mucous membranes are moist.      Pharynx: Oropharynx is clear. Posterior oropharyngeal erythema present. Eyes:      General:         Right eye: No discharge. Left eye: No discharge. Conjunctiva/sclera: Conjunctivae normal.   Neck:      Musculoskeletal: Normal range of motion and neck supple. Thyroid: No thyromegaly. Cardiovascular:      Rate and Rhythm: Normal rate and regular rhythm. Heart sounds: Normal heart sounds. No murmur. No friction rub. No gallop. Pulmonary:      Effort: Pulmonary effort is normal. No respiratory distress. Breath sounds: Normal breath sounds. No wheezing or rales. Chest:      Breasts: Breasts are symmetrical.         Right: Normal. No inverted nipple, mass, nipple discharge, skin change or tenderness. Left: Normal. No inverted nipple, mass, nipple discharge, skin change or tenderness. Abdominal:      General: Bowel sounds are normal. There is no distension. Palpations: Abdomen is soft. There is no mass. Tenderness: There is no abdominal tenderness. Genitourinary:     Labia:         Right: No lesion. Left: No lesion. Vagina: Normal. No vaginal discharge. Cervix: Friability and cervical bleeding present. No cervical motion tenderness or discharge. Uterus: Normal.       Adnexa: Right adnexa normal and left adnexa normal.        Right: No mass, tenderness or fullness. Left: No mass, tenderness or fullness. Rectum: Normal.   Musculoskeletal: Normal range of motion. Skin:     General: Skin is warm and dry. Coloration: Skin is not pale. Findings: No erythema or rash. Neurological:      Mental Status: She is alert and oriented to person, place, and time. Motor: No abnormal muscle tone. Coordination: Coordination normal.   Psychiatric:         Behavior: Behavior normal.         Thought Content: Thought content normal.         Judgment: Judgment normal.         Assessment/Plan:      ICD-10-CM    1. Well woman exam Z01.419 PAP SMEAR     C.trachomatis N.gonorrhoeae DNA     C.trachomatis N.gonorrhoeae DNA   2. Dysuria R30.0 montelukast (SINGULAIR) 10 MG tablet   3. Screening for cervical cancer Z12.4 PAP SMEAR   4. Encounter for initial prescription of contraceptive pills Z30.011    5. Acute bacterial sinusitis J01.90     B96.89        Start zyrtec in addition to [employer] flonase daily  Push fluids  Birth control pills sent to pharmacy  If wants to explore pregnancy options please let me know and I will place a referral to gynecology      Orders Placed This Encounter   Medications    montelukast (SINGULAIR) 10 MG tablet     Sig: TAKE 1 TABLET BY MOUTH NIGHTLY     Dispense:  30 tablet     Refill:  3    albuterol sulfate HFA (PROVENTIL HFA) 108 (90 Base) MCG/ACT inhaler     Sig: Inhale 2 puffs into the lungs every 4 hours as needed for Wheezing     Dispense:  1 Inhaler     Refill:  0    norgestimate-ethinyl estradiol (ORTHO-CYCLEN, 28,) 0.25-35 MG-MCG per tablet     Sig: Take 1 tablet by mouth daily     Dispense:  1 packet     Refill:  11    amoxicillin-clavulanate (AUGMENTIN) 875-125 MG per tablet     Sig: Take 1 tablet by mouth 2 times daily for 10 days     Dispense:  20 tablet     Refill:  0       Orders Placed This Encounter   Procedures    C.trachomatis N.gonorrhoeae DNA     Standing Status:   Future     Number of Occurrences:   1     Standing Expiration Date:   2/17/2021     Order Specific Question:   Source: Answer:   Vaginal    PAP SMEAR     Patient History:    Patient's last menstrual period was 02/03/2020 (approximate). OBGYN Status: Having periods  Past Surgical History:  04/13/2018: UPPER GASTROINTESTINAL ENDOSCOPY      Comment:  Esophagitis  No date: WISDOM TOOTH EXTRACTION      Social History    Tobacco Use      Smoking status: Never Smoker      Smokeless tobacco: Never Used       Order Specific Question:   Collection Type     Answer: Thin Prep     Order Specific Question:   Prior Abnormal Pap Test     Answer:   No     Order Specific Question:   Screening or Diagnostic     Answer:   Screening     Order Specific Question:   HPV Requested?      Answer:   Yes     Order Specific Question:   High Risk Patient     Answer:   N/A       Follow-up:    Return in about 1 year

## 2020-02-18 LAB
C TRACH DNA GENITAL QL NAA+PROBE: NEGATIVE
N. GONORRHOEAE DNA: NEGATIVE

## 2020-02-20 LAB
HPV COMMENT: NORMAL
HPV TYPE 16: NOT DETECTED
HPV TYPE 18: NOT DETECTED
HPVOH (OTHER TYPES): NOT DETECTED

## 2020-03-25 PROBLEM — R10.11 RUQ PAIN: Status: RESOLVED | Noted: 2020-03-25 | Resolved: 2020-03-24

## 2020-04-07 ENCOUNTER — VIRTUAL VISIT (OUTPATIENT)
Dept: FAMILY MEDICINE CLINIC | Age: 30
End: 2020-04-07
Payer: COMMERCIAL

## 2020-04-07 PROCEDURE — 99442 PR PHYS/QHP TELEPHONE EVALUATION 11-20 MIN: CPT | Performed by: FAMILY MEDICINE

## 2020-04-07 RX ORDER — SULFACETAMIDE SODIUM 100 MG/ML
2 SOLUTION/ DROPS OPHTHALMIC 3 TIMES DAILY
Qty: 1 BOTTLE | Refills: 0 | Status: SHIPPED | OUTPATIENT
Start: 2020-04-07 | End: 2020-04-12

## 2020-04-07 NOTE — PROGRESS NOTES
activity: Yes     Partners: Female   Lifestyle    Physical activity     Days per week: Not on file     Minutes per session: Not on file    Stress: Not on file   Relationships    Social connections     Talks on phone: Not on file     Gets together: Not on file     Attends Oriental orthodox service: Not on file     Active member of club or organization: Not on file     Attends meetings of clubs or organizations: Not on file     Relationship status: Not on file    Intimate partner violence     Fear of current or ex partner: Not on file     Emotionally abused: Not on file     Physically abused: Not on file     Forced sexual activity: Not on file   Other Topics Concern    Not on file   Social History Narrative    Not on file       Family History   Problem Relation Age of Onset    Diabetes Mother     Diabetes Father     Heart Disease Maternal Grandmother     Cancer Maternal Grandfather         colon cancer    Heart Disease Maternal Grandfather        There were no vitals filed for this visit. Wt Readings from Last 3 Encounters:   02/17/20 152 lb (68.9 kg)   08/30/19 160 lb (72.6 kg)   10/29/18 150 lb (68 kg)       Review of Systems   Constitutional: Negative for activity change and fever. Eyes:        Patient is complaining of irritation in the left eye. Yesterday she felt as if something got in it. It has been sore on the lower part of the eye and the lower eyelid since. It has been some reddish. Visual acuity is not affected. It feels as if something is irritating the lower eyelid. No systemic symptoms such as fever or chills. Musculoskeletal:        She also complaining of pain in the right ankle and foot. She reports about 3 weeks ago she dropped something on it and has had pain since. It is painful to bear weight. Is localized in the ankle and the area just in front of the ankle on the outside of the foot. She is trying to limit wrap without much success. She has used ibuprofen.   She reports

## 2020-04-10 ENCOUNTER — HOSPITAL ENCOUNTER (OUTPATIENT)
Age: 30
Discharge: HOME | End: 2020-04-10
Payer: COMMERCIAL

## 2020-04-10 ENCOUNTER — HOSPITAL ENCOUNTER (OUTPATIENT)
Dept: GENERAL RADIOLOGY | Age: 30
Discharge: HOME | End: 2020-04-10
Payer: COMMERCIAL

## 2020-04-10 PROCEDURE — 73630 X-RAY EXAM OF FOOT: CPT

## 2020-04-14 ENCOUNTER — TELEPHONE (OUTPATIENT)
Dept: FAMILY MEDICINE CLINIC | Age: 30
End: 2020-04-14

## 2020-04-15 ENCOUNTER — TELEPHONE (OUTPATIENT)
Dept: FAMILY MEDICINE CLINIC | Age: 30
End: 2020-04-15

## 2020-04-15 NOTE — TELEPHONE ENCOUNTER
Patient is going to call her insurance and see which Podiatrist they will cover.  Once she has name we will send to Mirela Henriquez to do Referral

## 2020-06-09 RX ORDER — MONTELUKAST SODIUM 10 MG/1
TABLET ORAL
Qty: 90 TABLET | Refills: 1 | Status: SHIPPED | OUTPATIENT
Start: 2020-06-09

## 2021-01-06 ENCOUNTER — APPOINTMENT (OUTPATIENT)
Dept: ULTRASOUND IMAGING | Age: 31
End: 2021-01-06
Payer: COMMERCIAL

## 2021-01-06 ENCOUNTER — HOSPITAL ENCOUNTER (EMERGENCY)
Age: 31
Discharge: HOME | End: 2021-01-06
Attending: EMERGENCY MEDICINE
Payer: COMMERCIAL

## 2021-01-06 ENCOUNTER — PATIENT MESSAGE (OUTPATIENT)
Dept: FAMILY MEDICINE CLINIC | Age: 31
End: 2021-01-06

## 2021-01-06 VITALS
HEIGHT: 64 IN | WEIGHT: 160 LBS | RESPIRATION RATE: 16 BRPM | TEMPERATURE: 98.1 F | OXYGEN SATURATION: 99 % | BODY MASS INDEX: 27.31 KG/M2 | DIASTOLIC BLOOD PRESSURE: 87 MMHG | SYSTOLIC BLOOD PRESSURE: 136 MMHG | HEART RATE: 76 BPM

## 2021-01-06 DIAGNOSIS — N88.9 ABNORMAL APPEARANCE OF CERVIX: ICD-10-CM

## 2021-01-06 DIAGNOSIS — N93.9 VAGINAL BLEEDING: Primary | ICD-10-CM

## 2021-01-06 LAB
A/G RATIO: 1.1 (ref 1.1–2.2)
ABO/RH: NORMAL
ALBUMIN SERPL-MCNC: 4.2 G/DL (ref 3.4–5)
ALP BLD-CCNC: 64 U/L (ref 40–129)
ALT SERPL-CCNC: 13 U/L (ref 10–40)
ANION GAP SERPL CALCULATED.3IONS-SCNC: 11 MMOL/L (ref 3–16)
AST SERPL-CCNC: 21 U/L (ref 15–37)
BASOPHILS ABSOLUTE: 0.1 K/UL (ref 0–0.2)
BASOPHILS RELATIVE PERCENT: 0.7 %
BILIRUB SERPL-MCNC: <0.2 MG/DL (ref 0–1)
BUN BLDV-MCNC: 13 MG/DL (ref 7–20)
CALCIUM SERPL-MCNC: 9.2 MG/DL (ref 8.3–10.6)
CHLORIDE BLD-SCNC: 102 MMOL/L (ref 99–110)
CO2: 25 MMOL/L (ref 21–32)
CREAT SERPL-MCNC: 0.6 MG/DL (ref 0.6–1.1)
EOSINOPHILS ABSOLUTE: 0.1 K/UL (ref 0–0.6)
EOSINOPHILS RELATIVE PERCENT: 1.2 %
GFR AFRICAN AMERICAN: >60
GFR NON-AFRICAN AMERICAN: >60
GLOBULIN: 3.7 G/DL
GLUCOSE BLD-MCNC: 95 MG/DL (ref 70–99)
GONADOTROPIN, CHORIONIC (HCG) QUANT: <5 MIU/ML
HCT VFR BLD CALC: 38.9 % (ref 36–48)
HEMOGLOBIN: 13.3 G/DL (ref 12–16)
LYMPHOCYTES ABSOLUTE: 3.1 K/UL (ref 1–5.1)
LYMPHOCYTES RELATIVE PERCENT: 29.5 %
MCH RBC QN AUTO: 28.4 PG (ref 26–34)
MCHC RBC AUTO-ENTMCNC: 34.1 G/DL (ref 31–36)
MCV RBC AUTO: 83.2 FL (ref 80–100)
MONOCYTES ABSOLUTE: 0.7 K/UL (ref 0–1.3)
MONOCYTES RELATIVE PERCENT: 7.2 %
NEUTROPHILS ABSOLUTE: 6.3 K/UL (ref 1.7–7.7)
NEUTROPHILS RELATIVE PERCENT: 61.4 %
PDW BLD-RTO: 13.2 % (ref 12.4–15.4)
PLATELET # BLD: 315 K/UL (ref 135–450)
PMV BLD AUTO: 7.5 FL (ref 5–10.5)
POTASSIUM SERPL-SCNC: 3.4 MMOL/L (ref 3.5–5.1)
RBC # BLD: 4.68 M/UL (ref 4–5.2)
SODIUM BLD-SCNC: 138 MMOL/L (ref 136–145)
SPECIMEN STATUS: NORMAL
TOTAL PROTEIN: 7.9 G/DL (ref 6.4–8.2)
WBC # BLD: 10.3 K/UL (ref 4–11)

## 2021-01-06 PROCEDURE — 99283 EMERGENCY DEPT VISIT LOW MDM: CPT

## 2021-01-06 PROCEDURE — 76830 TRANSVAGINAL US NON-OB: CPT

## 2021-01-06 PROCEDURE — 85025 COMPLETE CBC W/AUTO DIFF WBC: CPT

## 2021-01-06 PROCEDURE — 86900 BLOOD TYPING SEROLOGIC ABO: CPT

## 2021-01-06 PROCEDURE — 80053 COMPREHEN METABOLIC PANEL: CPT

## 2021-01-06 PROCEDURE — 84702 CHORIONIC GONADOTROPIN TEST: CPT

## 2021-01-06 PROCEDURE — 86901 BLOOD TYPING SEROLOGIC RH(D): CPT

## 2021-01-06 ASSESSMENT — ENCOUNTER SYMPTOMS
COUGH: 0
VOMITING: 0
CHEST TIGHTNESS: 0
STRIDOR: 0
SHORTNESS OF BREATH: 0
ABDOMINAL PAIN: 1
NAUSEA: 0
DIARRHEA: 0
WHEEZING: 0

## 2021-01-06 ASSESSMENT — PAIN SCALES - GENERAL: PAINLEVEL_OUTOF10: 4

## 2021-01-06 NOTE — ED NOTES
Bed: 29  Expected date:   Expected time:   Means of arrival:   Comments:     Holli Juarez RN  01/06/21 9989

## 2021-01-06 NOTE — TELEPHONE ENCOUNTER
From: Shiv Joyner  To: SUZETTE Mari - CNP  Sent: 1/6/2021 10:30 AM EST  Subject: Visit Follow-Up Question    I was wondering if I could make a visit maybe for today if you could squeeze me in I found out I am pregnant and I'm afraid I'm having a miscarriage I have been cramping and now I'm bleeding. The blood is looking like a redish brown.

## 2021-01-06 NOTE — ED PROVIDER NOTES
GASTROINTESTINAL ENDOSCOPY  04/13/2018    Esophagitis    WISDOM TOOTH EXTRACTION           CURRENT MEDICATIONS       Discharge Medication List as of 1/6/2021  4:56 PM      CONTINUE these medications which have NOT CHANGED    Details   SPRINTEC 28 0.25-35 MG-MCG per tablet TAKE 1 TABLET BY MOUTH EVERY DAY, Disp-84 tablet, R-1Normal      montelukast (SINGULAIR) 10 MG tablet TAKE 1 TABLET BY MOUTH EVERY DAY AT NIGHT, Disp-90 tablet,R-1Normal      albuterol sulfate HFA (PROVENTIL HFA) 108 (90 Base) MCG/ACT inhaler Inhale 2 puffs into the lungs every 4 hours as needed for Wheezing, Disp-1 Inhaler, R-0Normal      ibuprofen (IBU) 800 MG tablet Take 1 tablet by mouth every 6 hours as needed for Pain, Disp-30 tablet, R-0Print             ALLERGIES     Pertussis vaccines    FAMILY HISTORY       Family History   Problem Relation Age of Onset    Diabetes Mother     Diabetes Father     Heart Disease Maternal Grandmother     Cancer Maternal Grandfather         colon cancer    Heart Disease Maternal Grandfather           SOCIAL HISTORY       Social History     Socioeconomic History    Marital status: Single     Spouse name: None    Number of children: None    Years of education: None    Highest education level: None   Occupational History    None   Social Needs    Financial resource strain: None    Food insecurity     Worry: None     Inability: None    Transportation needs     Medical: None     Non-medical: None   Tobacco Use    Smoking status: Never Smoker    Smokeless tobacco: Never Used   Substance and Sexual Activity    Alcohol use:  Yes     Alcohol/week: 0.0 standard drinks     Comment: occasionally    Drug use: No    Sexual activity: Yes     Partners: Female   Lifestyle    Physical activity     Days per week: None     Minutes per session: None    Stress: None   Relationships    Social connections     Talks on phone: None     Gets together: None     Attends Evangelical service: None     Active member of club or organization: None     Attends meetings of clubs or organizations: None     Relationship status: None    Intimate partner violence     Fear of current or ex partner: None     Emotionally abused: None     Physically abused: None     Forced sexual activity: None   Other Topics Concern    None   Social History Narrative    None       SCREENINGS             PHYSICAL EXAM    (up to 7 for level 4, 8 ormore for level 5)     ED Triage Vitals [01/06/21 1159]   BP Temp Temp Source Pulse Resp SpO2 Height Weight   133/79 97.8 °F (36.6 °C) Oral 84 16 97 % 5' 4\" (1.626 m) 160 lb (72.6 kg)       Physical Exam  Constitutional:       General: She is not in acute distress. Appearance: Normal appearance. She is well-developed. She is not ill-appearing or toxic-appearing. Comments: Sitting in bed comfortably, speaking in full sentences, following verbal commands appropriately. Not in acute distress     HENT:      Head: Normocephalic and atraumatic. Eyes:      Conjunctiva/sclera: Conjunctivae normal.      Pupils: Pupils are equal, round, and reactive to light. Neck:      Musculoskeletal: Normal range of motion and neck supple. Cardiovascular:      Rate and Rhythm: Normal rate and regular rhythm. Heart sounds: Normal heart sounds. No murmur. No friction rub. No gallop. Pulmonary:      Effort: Pulmonary effort is normal. No respiratory distress. Breath sounds: Normal breath sounds. No decreased breath sounds, wheezing, rhonchi or rales. Abdominal:      General: Bowel sounds are normal. There is no distension. Palpations: Abdomen is soft. Tenderness: There is abdominal tenderness in the right lower quadrant. There is no guarding or rebound. Genitourinary:     Vagina: Bleeding present. Musculoskeletal: Normal range of motion. General: No tenderness or deformity. Skin:     General: Skin is warm and dry. Findings: No rash.    Neurological:      Mental Status: She is alert and oriented to person, place, and time. GCS: GCS eye subscore is 4. GCS verbal subscore is 5. GCS motor subscore is 6. Psychiatric:         Behavior: Behavior is cooperative. DIAGNOSTIC RESULTS     EKG: All EKG's are interpreted by the Emergency Department Physicianwho either signs or Co-signs this chart in the absence of a cardiologist.      RADIOLOGY:   Non-plain film images such as CT, Ultrasound and MRI are read by the radiologist. Plain radiographic images are visualized and preliminarily interpreted by the emergency physician with the below findings:      Interpretation per the Radiologist below, if available at the time of this note:    [address]   Final Result   Small anterior fundal fibroid.   Otherwise unremarkable pelvic ultrasound               ED BEDSIDE ULTRASOUND:   Performed by ED Physician - none    LABS:  Labs Reviewed   COMPREHENSIVE METABOLIC PANEL - Abnormal; Notable for the following components:       Result Value    Potassium 3.4 (*)     All other components within normal limits    Narrative:     Performed at:  [place] [address] [employer]   Phone [phone]   CBC WITH AUTO DIFFERENTIAL    Narrative:     Performed at:  [place] [address] [facility]   Phone [phone]   HCG, QUANTITATIVE, PREGNANCY    Narrative:     Performed at:  [place] [address] [facility]   Phone [phone]   SAMPLE POSSIBLE BLOOD BANK TESTING    Narrative:     Performed at:  [place] [address] [facility]   Phone [phone]   ABO/RH    Narrative:     Performed at:  [place] [address] [facility]   Phone [phone]       All other labs were within normal range eliseoot

## 2021-01-11 ENCOUNTER — PATIENT MESSAGE (OUTPATIENT)
Dept: FAMILY MEDICINE CLINIC | Age: 31
End: 2021-01-11

## 2021-01-14 DIAGNOSIS — Z87.59 MISCARRIAGE WITHIN LAST 12 MONTHS: Primary | ICD-10-CM

## 2021-07-29 ENCOUNTER — OFFICE VISIT (OUTPATIENT)
Dept: OBGYN CLINIC | Age: 31
End: 2021-07-29
Payer: COMMERCIAL

## 2021-07-29 VITALS
HEIGHT: 64 IN | BODY MASS INDEX: 27.49 KG/M2 | DIASTOLIC BLOOD PRESSURE: 70 MMHG | WEIGHT: 161 LBS | TEMPERATURE: 97.1 F | SYSTOLIC BLOOD PRESSURE: 130 MMHG | HEART RATE: 118 BPM

## 2021-07-29 DIAGNOSIS — N91.2 AMENORRHEA: ICD-10-CM

## 2021-07-29 DIAGNOSIS — N91.2 AMENORRHEA: Primary | ICD-10-CM

## 2021-07-29 DIAGNOSIS — Z20.2 POSSIBLE EXPOSURE TO STD: ICD-10-CM

## 2021-07-29 DIAGNOSIS — Z01.419 ENCOUNTER FOR ANNUAL ROUTINE GYNECOLOGICAL EXAMINATION: Primary | ICD-10-CM

## 2021-07-29 LAB
CONTROL: ABNORMAL
CRL: NORMAL
PREGNANCY TEST URINE, POC: POSITIVE
SAC DIAMETER: NORMAL

## 2021-07-29 PROCEDURE — 76801 OB US < 14 WKS SINGLE FETUS: CPT | Performed by: OBSTETRICS & GYNECOLOGY

## 2021-07-29 PROCEDURE — 81025 URINE PREGNANCY TEST: CPT | Performed by: OBSTETRICS & GYNECOLOGY

## 2021-07-29 PROCEDURE — 99385 PREV VISIT NEW AGE 18-39: CPT | Performed by: OBSTETRICS & GYNECOLOGY

## 2021-07-29 RX ORDER — PYRIDOXINE HCL (VITAMIN B6) 25 MG
25 TABLET ORAL 2 TIMES DAILY PRN
Qty: 30 TABLET | Refills: 1 | Status: SHIPPED | OUTPATIENT
Start: 2021-07-29 | End: 2021-12-06

## 2021-07-29 ASSESSMENT — PATIENT HEALTH QUESTIONNAIRE - PHQ9
SUM OF ALL RESPONSES TO PHQ QUESTIONS 1-9: 0
SUM OF ALL RESPONSES TO PHQ9 QUESTIONS 1 & 2: 0
1. LITTLE INTEREST OR PLEASURE IN DOING THINGS: 0
2. FEELING DOWN, DEPRESSED OR HOPELESS: 0
SUM OF ALL RESPONSES TO PHQ QUESTIONS 1-9: 0
SUM OF ALL RESPONSES TO PHQ QUESTIONS 1-9: 0

## 2021-07-29 ASSESSMENT — ENCOUNTER SYMPTOMS
CONSTIPATION: 0
VOMITING: 0
ABDOMINAL PAIN: 0
DIARRHEA: 0
NAUSEA: 1
SHORTNESS OF BREATH: 0

## 2021-07-29 NOTE — PROGRESS NOTES
Annual Exam      CC:   Chief Complaint   Patient presents with    New Patient       HPI:  27 y.o. Timmy Hoskins presents for her gynecologic annual exam.     Also here for amenorrhea. LMP 21. Certain date. q28-30d, 7d, moderate flow, some clots, occasional cramps. No VB since last period, occ cramps and some dizziness. +nausea, vomited yesterday but that was the first time this pregnancy. Has had a headache all day, didn't help. Has a history of migraines. Took 1 tylenol earlier which didn't really help. Patient seen and examined. Review of Systems:   Review of Systems   Constitutional: Negative for chills and fever. Respiratory: Negative for shortness of breath. Cardiovascular: Negative for chest pain. Gastrointestinal: Positive for nausea. Negative for abdominal pain, constipation, diarrhea and vomiting. Genitourinary: Positive for menstrual problem. Negative for difficulty urinating, dysuria, vaginal bleeding and vaginal discharge. Neurological: Positive for headaches. Primary Care Physician: Cat Prim, DO    Obstetric History  OB History    Para Term  AB Living   2       2 0   SAB TAB Ectopic Molar Multiple Live Births   2                # Outcome Date GA Lbr Butch/2nd Weight Sex Delivery Anes PTL Lv   2 SAB            1 SAB                Gynecologic History  Menstrual History:   LMP: 21   Menstrual pattern: see above  Sexual History:   Contraception: none   Currently is sexually active   None history of STIs   No sexual problems  Pap History:   Last pap smear: 2020 NILM/HPV neg   History of abnormal pap smears: denies    Medical History:  Past Medical History:   Diagnosis Date    Asthma     asthmatic bronchitis treatment.   not dx asthma    Autism     Generalized headaches        Surgical History:  Past Surgical History:   Procedure Laterality Date    UPPER GASTROINTESTINAL ENDOSCOPY  2018    Esophagitis    WISDOM TOOTH EXTRACTION Medications:  Current Outpatient Medications   Medication Sig Dispense Refill    montelukast (SINGULAIR) 10 MG tablet TAKE 1 TABLET BY MOUTH EVERY DAY AT NIGHT 90 tablet 1    albuterol sulfate HFA (PROVENTIL HFA) 108 (90 Base) MCG/ACT inhaler Inhale 2 puffs into the lungs every 4 hours as needed for Wheezing 1 Inhaler 0     No current facility-administered medications for this visit. Allergies: Allergies   Allergen Reactions    Pertussis Vaccines        Social History:  Social History     Socioeconomic History    Marital status: Single     Spouse name: None    Number of children: None    Years of education: None    Highest education level: None   Occupational History    None   Tobacco Use    Smoking status: Never Smoker    Smokeless tobacco: Never Used   Vaping Use    Vaping Use: Never used   Substance and Sexual Activity    Alcohol use: Yes     Alcohol/week: 0.0 standard drinks     Comment: occasionally    Drug use: No    Sexual activity: Yes     Partners: Female   Other Topics Concern    None   Social History Narrative    None     Social Determinants of Health     Financial Resource Strain:     Difficulty of Paying Living Expenses:    Food Insecurity:     Worried About Running Out of Food in the Last Year:     Ran Out of Food in the Last Year:    Transportation Needs:     Lack of Transportation (Medical):      Lack of Transportation (Non-Medical):    Physical Activity:     Days of Exercise per Week:     Minutes of Exercise per Session:    Stress:     Feeling of Stress :    Social Connections:     Frequency of Communication with Friends and Family:     Frequency of Social Gatherings with Friends and Family:     Attends Anabaptism Services:     Active Member of Clubs or Organizations:     Attends Club or Organization Meetings:     Marital Status:    Intimate Partner Violence:     Fear of Current or Ex-Partner:     Emotionally Abused:     Physically Abused:     Sexually Abused:        Family History:  Family History   Problem Relation Age of Onset    Diabetes Mother     Diabetes Father     Heart Disease Maternal Grandmother     Cancer Maternal Grandfather         colon cancer    Heart Disease Maternal Grandfather        See above, otherwise denies personal/family history of cervical, uterine, ovarian, vulvar, breast, or colon cancers. Objective: Body mass index is 27.64 kg/m². /70 (Site: Right Upper Arm, Position: Sitting, Cuff Size: Medium Adult)   Pulse 118   Temp 97.1 °F (36.2 °C)   Ht 5' 4\" (1.626 m)   Wt 161 lb (73 kg)   LMP 2021   BMI 27.64 kg/m²     Exam:   Physical Exam  Exam conducted with a chaperone present. Constitutional:       Appearance: She is well-developed. HENT:      Head: Normocephalic and atraumatic. Cardiovascular:      Rate and Rhythm: Normal rate and regular rhythm. Pulmonary:      Effort: Pulmonary effort is normal. No respiratory distress. Chest:      Breasts:         Right: Normal. No mass, nipple discharge or skin change. Left: Normal. No mass, nipple discharge or skin change. Abdominal:      General: There is no distension. Palpations: Abdomen is soft. Tenderness: There is no abdominal tenderness. There is no guarding or rebound. Genitourinary:     Comments: Pelvic exam: VULVA: normal appearing vulva with no masses, tenderness or lesions, VAGINA: normal appearing vagina with normal color and discharge, no lesions, CERVIX: normal appearing cervix without discharge or lesions, UTERUS: uterus is normal size, shape, consistency and nontender, ADNEXA: normal adnexa in size, nontender and no masses. Musculoskeletal:      Cervical back: Normal range of motion. Neurological:      Mental Status: She is alert and oriented to person, place, and time. Assessment/Plan:  27 y.o.  presenting for her annual exam:    1.  Encounter for annual routine gynecological examination  Discussed age appropriate screening recommendations, pap smear up-to-date. Discussed breast self awareness, STI screening as below. - VAGINAL PATHOGENS PROBE *A  - C.trachomatis N.gonorrhoeae DNA    2. Possible exposure to STD  - VAGINAL PATHOGENS PROBE *A  - C.trachomatis N.gonorrhoeae DNA    3. Amenorrhea  Patient with amenorrhea, US today shows single IUP with cardiac activity at 11+6 with final ANDERSON 2/11/21 by L=12wk US.  - Discussed routine prenatal care, early pregnancy precautions, continued use of PNV  - Below labs sent  - Briefly reviewed options for genetic screening including cffDNA, 1st trimester screen with NT/JOANNE-A, and MQS in 2nd trimester. Will readdress at next visit. - Culture, Urine  - URINALYSIS  - POCT urine pregnancy    4. Nausea and vomiting in pregnancy  Rx B6/doxylamine today.   Return precautiosn reviewed with patient    Dispo: 4 weeks for IPV  Tono Harper MD

## 2021-07-30 LAB
C TRACH DNA GENITAL QL NAA+PROBE: NEGATIVE
CANDIDA SPECIES, DNA PROBE: ABNORMAL
GARDNERELLA VAGINALIS, DNA PROBE: ABNORMAL
N. GONORRHOEAE DNA: NEGATIVE
TRICHOMONAS VAGINALIS DNA: ABNORMAL

## 2021-08-27 ENCOUNTER — INITIAL PRENATAL (OUTPATIENT)
Dept: OBGYN CLINIC | Age: 31
End: 2021-08-27
Payer: COMMERCIAL

## 2021-08-27 VITALS
DIASTOLIC BLOOD PRESSURE: 66 MMHG | HEART RATE: 116 BPM | WEIGHT: 161.2 LBS | SYSTOLIC BLOOD PRESSURE: 124 MMHG | BODY MASS INDEX: 27.67 KG/M2

## 2021-08-27 DIAGNOSIS — Z34.92 PRENATAL CARE IN SECOND TRIMESTER: Primary | ICD-10-CM

## 2021-08-27 LAB
BASOPHILS ABSOLUTE: 0.1 K/UL (ref 0–0.2)
BASOPHILS RELATIVE PERCENT: 0.5 %
EOSINOPHILS ABSOLUTE: 0.1 K/UL (ref 0–0.6)
EOSINOPHILS RELATIVE PERCENT: 1.3 %
HCT VFR BLD CALC: 34.5 % (ref 36–48)
HEMOGLOBIN: 11.7 G/DL (ref 12–16)
LYMPHOCYTES ABSOLUTE: 2.2 K/UL (ref 1–5.1)
LYMPHOCYTES RELATIVE PERCENT: 19.3 %
MCH RBC QN AUTO: 27.8 PG (ref 26–34)
MCHC RBC AUTO-ENTMCNC: 33.9 G/DL (ref 31–36)
MCV RBC AUTO: 82 FL (ref 80–100)
MONOCYTES ABSOLUTE: 0.7 K/UL (ref 0–1.3)
MONOCYTES RELATIVE PERCENT: 6.4 %
NEUTROPHILS ABSOLUTE: 8.2 K/UL (ref 1.7–7.7)
NEUTROPHILS RELATIVE PERCENT: 72.5 %
PDW BLD-RTO: 13.1 % (ref 12.4–15.4)
PLATELET # BLD: 270 K/UL (ref 135–450)
PMV BLD AUTO: 8.6 FL (ref 5–10.5)
RBC # BLD: 4.21 M/UL (ref 4–5.2)
WBC # BLD: 11.3 K/UL (ref 4–11)

## 2021-08-27 PROCEDURE — 0500F INITIAL PRENATAL CARE VISIT: CPT | Performed by: OBSTETRICS & GYNECOLOGY

## 2021-08-27 PROCEDURE — 36415 COLL VENOUS BLD VENIPUNCTURE: CPT | Performed by: OBSTETRICS & GYNECOLOGY

## 2021-08-27 NOTE — PROGRESS NOTES
Initial OB Office Visit    CC:   Chief Complaint   Patient presents with    Initial Prenatal Visit       HPI:  Pt seen and examined. No concerns/complaints. Has been having a lot of anxiety about the pregnancy and what to expect moving forward. Is anxious about delivery as well. No VB, LOF. Some mild cramps, nothing regular or severe. No fetal movement. Nausea and vomiting are better, comes and goes still but better than 4 weeks ago. Using B6, seems to help. Maternal wellness questionnaire reviewed - no concerns today. Score 12. No SI/HI.      Objective:  /66   Pulse 116   Wt 161 lb 3.2 oz (73.1 kg)   LMP 2021   BMI 27.67 kg/m²   Gen: AO, NAD  Abd: Soft, NT  FHT: 155    Assessment/Plan:  27 y.o.  at 16w0d (Estimated Date of Delivery: 22) presents for DANIEL appointment:     Problem List Items Addressed This Visit        Gynecology/Obstetric Problems    Prenatal care in second trimester - Primary     - FWB: reassuring by selin today  - Genetic screening: MQS today  - Anatomy scan: plan at 20 weeks  - Tdap: plan at 28 weeks  - PNL: sent today, GCCT/trich neg, UCx neg          Relevant Orders    HIV Screen    Hep C AB RLFX HCV PCR-A    Hepatitis B Surface Antigen    Syphilis Antibody Cascading Reflex    Type and Screen    CBC Auto Differential (Completed)    Rubella antibody, IgG    Varicella Zoster Antibody, IgG    Drug Screen Multi Urine With Bup (Completed)    Culture, Urine    URINALYSIS WITH MICROSCOPIC (Completed)    MATERNAL SCREEN 4          Dispo: RTC in 4 weeks, anatomy US at that time  Camilo Bunn MD

## 2021-08-28 LAB
ABO/RH: NORMAL
AMPHETAMINE SCREEN, URINE: NORMAL
ANTIBODY SCREEN: NORMAL
BARBITURATE SCREEN URINE: NORMAL
BENZODIAZEPINE SCREEN, URINE: NORMAL
BILIRUBIN URINE: NEGATIVE
BLOOD, URINE: NEGATIVE
BUPRENORPHINE URINE: NORMAL
CANNABINOID SCREEN URINE: NORMAL
CLARITY: CLEAR
COCAINE METABOLITE SCREEN URINE: NORMAL
COLOR: YELLOW
EPITHELIAL CELLS, UA: 3 /HPF (ref 0–5)
GLUCOSE URINE: NEGATIVE MG/DL
HEPATITIS B SURFACE ANTIGEN INTERPRETATION: NORMAL
HIV AG/AB: NORMAL
HIV ANTIGEN: NORMAL
HIV-1 ANTIBODY: NORMAL
HIV-2 AB: NORMAL
HYALINE CASTS: 3 /LPF (ref 0–8)
KETONES, URINE: NEGATIVE MG/DL
LEUKOCYTE ESTERASE, URINE: NEGATIVE
Lab: NORMAL
METHADONE SCREEN, URINE: NORMAL
MICROSCOPIC EXAMINATION: NORMAL
NITRITE, URINE: NEGATIVE
OPIATE SCREEN URINE: NORMAL
OXYCODONE URINE: NORMAL
PH UA: 6
PH UA: 7 (ref 5–8)
PHENCYCLIDINE SCREEN URINE: NORMAL
PROPOXYPHENE SCREEN: NORMAL
PROTEIN UA: NEGATIVE MG/DL
RBC UA: 1 /HPF (ref 0–4)
RUBELLA ANTIBODY IGG: 257.7 IU/ML
SPECIFIC GRAVITY UA: 1.02 (ref 1–1.03)
TOTAL SYPHILLIS IGG/IGM: NORMAL
URINE TYPE: NORMAL
UROBILINOGEN, URINE: 0.2 E.U./DL
VARICELLA-ZOSTER VIRUS AB, IGG: NORMAL
WBC UA: 1 /HPF (ref 0–5)

## 2021-08-28 NOTE — ASSESSMENT & PLAN NOTE
- FWB: reassuring by selin today  - Genetic screening: MQS today  - Anatomy scan: plan at 20 weeks  - Tdap: plan at 28 weeks  - PNL: sent today, GCCT/trich neg, UCx neg

## 2021-08-29 LAB
HEPATITIS C VIRUS AB BY CIA INDEX: 0.02 IV
HEPATITIS C VIRUS AB BY CIA INTERPRETATION: NEGATIVE
URINE CULTURE, ROUTINE: NORMAL

## 2021-08-31 LAB
AFP INTERPRETATION: NORMAL
AFP MOM: 1.1
AFP SPECIMEN: NORMAL
D-INHIBIN: 133 PG/ML
DATING: NORMAL
EER MATERNAL SCREEN AFP, HCG, EST, INH: NORMAL
ESTIMATED DUE DATE: NORMAL
FETUS COUNT: NORMAL
GESTATIONAL AGE CALC AT COLLECT: NORMAL
HISTORY OF ANEUPLOIDY?: NO
HISTORY/NEURAL TUBE DEFECTS: NO
INSULIN DEP. DIABETIC: NO
MATERNAL AGE AT EDD: 32.9 YR
MATERNAL WEIGHT: NORMAL
MOM FOR HCG, 2ND TRIMESTER: 0.7
MOM FOR UE3: 1.03
MOM INHIBN: 0.9
PATIENT'S HCG, 2ND TRIMESTER: NORMAL IU/L
PT AFP: 36 NG/ML
PT UE3: 1.02 NG/ML
RACE: NORMAL
SMOKING: NO

## 2021-09-03 ENCOUNTER — TELEPHONE (OUTPATIENT)
Dept: FAMILY MEDICINE CLINIC | Age: 31
End: 2021-09-03

## 2021-09-03 NOTE — TELEPHONE ENCOUNTER
----- Message from Crystal Vargas [place] sent at 9/3/2021  3:31 PM EDT -----  Subject: Referral Request    QUESTIONS   Reason for referral request? Patient states she needs a different referral   as she is not \"feeling like I'm not getting the help I need, and not   explaining the details to me and I'm getting tired of it. \"   Has the physician seen you for this condition before? No   Preferred Specialist (if applicable)? Do you already have an appointment scheduled? No  Additional Information for Provider? Patient will only work with a female   OB/GYN and would like someone to reach out to her on [employer] to advise as   she states she is unhappy with current OB/GYN referral. States she needs   more reassurance. ---------------------------------------------------------------------------  --------------  [facility] [employer]  What is the best way for the office to contact you? OK to leave message on   voicemail, OK to respond with electronic message via [employer] portal (only   for patients who have registered [employer] account)  Preferred Call Back Phone Number?  [phone]

## 2021-09-08 NOTE — TELEPHONE ENCOUNTER
Called the patient and got her voicemail.   Left a message she might reach out to the [id number] [facility] group and let me know what they say and who might see her there and I can do a referral.      To call if she has further questions

## 2021-09-13 ENCOUNTER — TELEPHONE (OUTPATIENT)
Dept: OBGYN CLINIC | Age: 31
End: 2021-09-13

## 2021-09-13 NOTE — TELEPHONE ENCOUNTER
Prenatal vitamin pending to send to the pharmacy, did not pend on previous message. Routing to Dr. Alice Quezada.

## 2021-09-13 NOTE — TELEPHONE ENCOUNTER
Patient has questions about her MyChart results. Unsure why she got some abnormal results and she was informed that everything was fine when it is flagged abnormal in chart. I reviewed the CBC that had the H&H very minimally low, and the WBC very minimally high. The providers would not treat for anything at that point, they would just wait till her 28 week labs to see where her count is. She was aware of her yeast infection and is fine with that. She was not asked by the provider to see if she needed PNV, and she has not been on them at all. PNV pending approval.  Pharmacy is correct in the chart. She is not sure what questions to ask as she has not been this far in her pregnancy before and this will be her first kid. She will not stand for an office to not give her all the information about her test results or with her care as she is not sure what to ask when she is asked if she has any questions. Patient confirmed that she received the prenatal folder and has not read over the information. She feels the doctor should be the one to review it with her not her staff, she confirmed that Dr. Paul Veras assistant did review the packet with her. Patient asked about the next appointment that is with Dr. Kwabena Cruz and wanted to know why it was 45 minutes. Explained that this is the anatomy scan and the ultrasound tech has to see certain parts of the baby for the anatomy scan, this is more in depth views and that is why the scan takes longer. Patient will be bringing her  with her to this appointment. Reviewed patient's demographics as she is listed as single not , corrected this in chart; her  is listed as female not male, unable to find gender specified with , listed him as male and  under contacts. Patient is wanting to switch providers from Dr. Roxanne Ojeda to another provider in the office. She is aware that Dr. Roxanne Ojeda may deliver her and okay with that.       Routing to Dr. Nieves Davis as [facility].

## 2021-09-14 RX ORDER — CHOLECALCIFEROL (VITAMIN D3) 25 MCG
1 TABLET,CHEWABLE ORAL DAILY
Qty: 30 CAPSULE | Refills: 8 | Status: SHIPPED | OUTPATIENT
Start: 2021-09-14 | End: 2021-10-14

## 2021-09-14 NOTE — TELEPHONE ENCOUNTER
Patient returned call to office.  Is aware of ability to switch physicians, and prenatal vitamins at pharmacy

## 2021-09-15 ENCOUNTER — HOSPITAL ENCOUNTER (EMERGENCY)
Age: 31
Discharge: HOME | End: 2021-09-15
Payer: COMMERCIAL

## 2021-09-15 VITALS
DIASTOLIC BLOOD PRESSURE: 60 MMHG | RESPIRATION RATE: 17 BRPM | HEIGHT: 64 IN | OXYGEN SATURATION: 95 % | SYSTOLIC BLOOD PRESSURE: 105 MMHG | WEIGHT: 161 LBS | HEART RATE: 79 BPM | TEMPERATURE: 98.2 F | BODY MASS INDEX: 27.49 KG/M2

## 2021-09-15 DIAGNOSIS — R10.2 PELVIC CRAMPING: ICD-10-CM

## 2021-09-15 DIAGNOSIS — Z34.92 SECOND TRIMESTER PREGNANCY: Primary | ICD-10-CM

## 2021-09-15 DIAGNOSIS — R51.9 ACUTE NONINTRACTABLE HEADACHE, UNSPECIFIED HEADACHE TYPE: ICD-10-CM

## 2021-09-15 LAB
BACTERIA: ABNORMAL /HPF
BILIRUBIN URINE: NEGATIVE
BLOOD, URINE: NEGATIVE
CLARITY: ABNORMAL
COLOR: YELLOW
EPITHELIAL CELLS, UA: ABNORMAL /HPF (ref 0–5)
GLUCOSE URINE: NEGATIVE MG/DL
KETONES, URINE: NEGATIVE MG/DL
LEUKOCYTE ESTERASE, URINE: ABNORMAL
MICROSCOPIC EXAMINATION: YES
NITRITE, URINE: NEGATIVE
PH UA: 7 (ref 5–8)
PROTEIN UA: NEGATIVE MG/DL
RBC UA: ABNORMAL /HPF (ref 0–4)
SARS-COV-2, NAAT: NOT DETECTED
SPECIFIC GRAVITY UA: 1.01 (ref 1–1.03)
URINE TYPE: ABNORMAL
UROBILINOGEN, URINE: 0.2 E.U./DL
WBC UA: ABNORMAL /HPF (ref 0–5)

## 2021-09-15 PROCEDURE — 2580000003 HC RX 258: Performed by: PHYSICIAN ASSISTANT

## 2021-09-15 PROCEDURE — 99283 EMERGENCY DEPT VISIT LOW MDM: CPT

## 2021-09-15 PROCEDURE — 81001 URINALYSIS AUTO W/SCOPE: CPT

## 2021-09-15 PROCEDURE — 87635 SARS-COV-2 COVID-19 AMP PRB: CPT

## 2021-09-15 PROCEDURE — 96375 TX/PRO/DX INJ NEW DRUG ADDON: CPT

## 2021-09-15 PROCEDURE — 6360000002 HC RX W HCPCS: Performed by: PHYSICIAN ASSISTANT

## 2021-09-15 PROCEDURE — 6370000000 HC RX 637 (ALT 250 FOR IP): Performed by: PHYSICIAN ASSISTANT

## 2021-09-15 PROCEDURE — 96374 THER/PROPH/DIAG INJ IV PUSH: CPT

## 2021-09-15 RX ORDER — DIPHENHYDRAMINE HYDROCHLORIDE 50 MG/ML
25 INJECTION INTRAMUSCULAR; INTRAVENOUS ONCE
Status: COMPLETED | OUTPATIENT
Start: 2021-09-15 | End: 2021-09-15

## 2021-09-15 RX ORDER — 0.9 % SODIUM CHLORIDE 0.9 %
1000 INTRAVENOUS SOLUTION INTRAVENOUS ONCE
Status: COMPLETED | OUTPATIENT
Start: 2021-09-15 | End: 2021-09-15

## 2021-09-15 RX ORDER — METOCLOPRAMIDE HYDROCHLORIDE 5 MG/ML
10 INJECTION INTRAMUSCULAR; INTRAVENOUS ONCE
Status: COMPLETED | OUTPATIENT
Start: 2021-09-15 | End: 2021-09-15

## 2021-09-15 RX ORDER — ACETAMINOPHEN 325 MG/1
650 TABLET ORAL ONCE
Status: COMPLETED | OUTPATIENT
Start: 2021-09-15 | End: 2021-09-15

## 2021-09-15 RX ADMIN — DIPHENHYDRAMINE HYDROCHLORIDE 25 MG: 50 INJECTION, SOLUTION INTRAMUSCULAR; INTRAVENOUS at 10:13

## 2021-09-15 RX ADMIN — SODIUM CHLORIDE 1000 ML: 9 INJECTION, SOLUTION INTRAVENOUS at 10:16

## 2021-09-15 RX ADMIN — METOCLOPRAMIDE 10 MG: 5 INJECTION, SOLUTION INTRAMUSCULAR; INTRAVENOUS at 10:13

## 2021-09-15 RX ADMIN — ACETAMINOPHEN 650 MG: 325 TABLET ORAL at 10:13

## 2021-09-15 ASSESSMENT — ENCOUNTER SYMPTOMS
DIARRHEA: 0
VOMITING: 0
COLOR CHANGE: 0
ABDOMINAL PAIN: 1
SHORTNESS OF BREATH: 0
SINUS PRESSURE: 1
BACK PAIN: 0
EYES NEGATIVE: 1
NAUSEA: 0
COUGH: 0

## 2021-09-15 ASSESSMENT — PAIN DESCRIPTION - DESCRIPTORS: DESCRIPTORS: CRAMPING

## 2021-09-15 ASSESSMENT — PAIN SCALES - GENERAL
PAINLEVEL_OUTOF10: 4
PAINLEVEL_OUTOF10: 4

## 2021-09-15 ASSESSMENT — PAIN DESCRIPTION - LOCATION: LOCATION: ABDOMEN

## 2021-09-15 ASSESSMENT — PAIN DESCRIPTION - PAIN TYPE: TYPE: ACUTE PAIN

## 2021-09-15 NOTE — ED NOTES
[facility] ob/gyn @ [id number]   Re: Dr. Marina Merlin-- 17 weeks preg  Dr. Tosha Sheppard @ [facility] 1  09/15/21 1748

## 2021-09-15 NOTE — ED PROVIDER NOTES
 Highest education level: None   Occupational History    None   Tobacco Use    Smoking status: Never Smoker    Smokeless tobacco: Never Used   Vaping Use    Vaping Use: Never used   Substance and Sexual Activity    Alcohol use: Not Currently     Alcohol/week: 0.0 standard drinks     Comment: occasionally    Drug use: No    Sexual activity: Yes     Partners: Female   Other Topics Concern    None   Social History Narrative    None     Social Determinants of Health     Financial Resource Strain:     Difficulty of Paying Living Expenses:    Food Insecurity:     Worried About Running Out of Food in the Last Year:     Ran Out of Food in the Last Year:    Transportation Needs:     Lack of Transportation (Medical):  Lack of Transportation (Non-Medical):    Physical Activity:     Days of Exercise per Week:     Minutes of Exercise per Session:    Stress:     Feeling of Stress :    Social Connections:     Frequency of Communication with Friends and Family:     Frequency of Social Gatherings with Friends and Family:     Attends Holiness Services:     Active Member of Clubs or Organizations:     Attends Club or Organization Meetings:     Marital Status:    Intimate Partner Violence:     Fear of Current or Ex-Partner:     Emotionally Abused:     Physically Abused:     Sexually Abused:        SCREENINGS:             PHYSICAL EXAM:       ED Triage Vitals [09/15/21 0929]   BP Temp Temp Source Pulse Resp SpO2 Height Weight   129/70 98.2 °F (36.8 °C) Oral 89 16 97 % 5' 4\" (1.626 m) 161 lb (73 kg)       Physical Exam    CONSTITUTIONAL: Awake and alert. Cooperative. Well-developed. Well-nourished. Non-toxic. No acute distress. HENT: Normocephalic. Atraumatic. External ears normal, without discharge. No nasal discharge. Oropharynx clear. Mucous membranes moist.  EYES: Conjunctiva non-injected. No scleral icterus. PERRL. EOM's grossly intact. NECK: Supple. Normal ROM. CARDIOVASCULAR: RRR. No Murmer. INFLAMMATORY DISEASE, PERFORATED BOWEL or ULCER, ECTOPIC PREGNANCY, or TUBO-OVARIAN ABSCESS, thus I consider the discharge disposition reasonable. Also, there is no evidence or peritonitis, sepsis, or toxicity. Haley Hunt and I have discussed the diagnosis and risks, and we agree with discharging home to follow-up with their primary doctor. We also discussed returning to the Emergency Department immediately if new or worsening symptoms occur. We have discussed the symptoms which are most concerning (e.g., bloody stool, fever, changing or worsening pain, vomiting) that necessitate immediate return. FINAL IMPRESSION:      1. Second trimester pregnancy    2. Acute nonintractable headache, unspecified headache type    3.  Pelvic cramping          DISPOSITION/PLAN:   DISPOSITION Decision To Discharge      PATIENT REFERRED TO:  Avelina Serrano DO  [address]  [facility] [place]  [address]    Schedule an appointment as soon as possible for a visit         DISCHARGE MEDICATIONS:  New Prescriptions    No medications on file                  (Please note thatportions of this note were completed with a voice recognition program.  Efforts were made to edit the dictations, but occasionally words are mis-transcribed.)    Jules Richards PA-C (electronicallysigned)              Debby Colbert, [address]  09/15/21 1224

## 2021-09-15 NOTE — ED TRIAGE NOTES
[facility]uel [facility] at triage assessing patient.  Fetal heart tones obtained 148bpm.

## 2021-09-20 ENCOUNTER — OFFICE VISIT (OUTPATIENT)
Dept: OBGYN CLINIC | Age: 31
End: 2021-09-20
Payer: COMMERCIAL

## 2021-09-20 ENCOUNTER — ROUTINE PRENATAL (OUTPATIENT)
Dept: OBGYN CLINIC | Age: 31
End: 2021-09-20

## 2021-09-20 VITALS
BODY MASS INDEX: 28.05 KG/M2 | DIASTOLIC BLOOD PRESSURE: 66 MMHG | HEART RATE: 105 BPM | WEIGHT: 163.4 LBS | SYSTOLIC BLOOD PRESSURE: 110 MMHG

## 2021-09-20 DIAGNOSIS — Z34.92 PRENATAL CARE, SECOND TRIMESTER: Primary | ICD-10-CM

## 2021-09-20 DIAGNOSIS — R10.2 PELVIC CRAMPING: ICD-10-CM

## 2021-09-20 DIAGNOSIS — R07.89 OTHER CHEST PAIN: ICD-10-CM

## 2021-09-20 LAB
ABDOMINAL CIRCUMFERENCE: NORMAL
BIPARIETAL DIAMETER: NORMAL
ESTIMATED FETAL WEIGHT: NORMAL
FEMORAL DIAMETER: NORMAL
HC/AC: NORMAL
HEAD CIRCUMFERENCE: NORMAL

## 2021-09-20 PROCEDURE — 0502F SUBSEQUENT PRENATAL CARE: CPT | Performed by: OBSTETRICS & GYNECOLOGY

## 2021-09-20 PROCEDURE — 76805 OB US >/= 14 WKS SNGL FETUS: CPT | Performed by: OBSTETRICS & GYNECOLOGY

## 2021-09-20 RX ORDER — NITROFURANTOIN 25; 75 MG/1; MG/1
100 CAPSULE ORAL 2 TIMES DAILY
Qty: 10 CAPSULE | Refills: 0 | Status: SHIPPED | OUTPATIENT
Start: 2021-09-20 | End: 2021-09-25

## 2021-09-20 NOTE — PROGRESS NOTES
27 y.o.  at 19w3d EGA Estimated Date of Delivery: 22 here for DANIEL:     HPI:  Pt seen and examined. No concerns/complaints. No VB, LOF. Some mild cramps, nothing regular or severe. No fetal movement. Nausea and vomiting are better, comes and goes still but better than 4 weeks ago. Using B6, seems to help. Does admit to inconsistent chest pain -- recommend complete EKG. Maternal wellness questionnaire reviewed - no concerns today. Score 2. No SI/HI. Objective:  /66   Pulse 105   Wt 163 lb 6.4 oz (74.1 kg)   LMP 2021   BMI 28.05 kg/m²   Gen: AO, NAD  Abd: Soft, NT  FHT: 130 bpm    Images:   OBSTETRIC ULTRASOUND -- SECOND TRIMESTER    DATE:  2021    PHYSICIAN: GLORY Kern.    SONOGRAPHER: BLESSING Aguayo RDMS    INDICATION:  Second trimester, Anatomical screening    TYPE OF SCAN: abdominal vaginal 3.5 MHz 5MHz    FINDINGS:      A single viable intrauterine pregnancy is noted in variable presentation. Cardiac and somatic activity are noted. The following values were obtained:   Fetal heart rate    155bpm   BPD      4.57cm  67.0 %   Head Circumference    17.35cm 65.2 %    Abdominal Circumference   16.02cm 91.1 %   Femur Length     3.25cm  68.8 %   Humerus Length    3.13cm  84.2 %   Cerebellum     1.99cm  46.7 %   Amniotic fluid DVP    5.16cm   EFW      362.g  94.9 percentile    Subjective amniotic fluid volume is normal. Based on sonographic criteria, the estimated fetal age is 20weeks and 1days with EDC of 2021. There is a 5 day discordance with the established EDC of 2022. The patient has a posterior placenta that is adequate distance in relation to the internal cervical os. The evaluation of the lower uterine segment and cervix reveals normal appearing anatomy. Transvaginal cervical length is 5.07cm with no funneling noted. The uterus is unremarkable/gravid.  Maternal ovaries and adnexae are not well visualized due to the size of the uterus and patient's gravid state. Normal Anatomy Seen:  4 chamber heart   Spine    CSP  LVOT     Kidneys   Lateral ventricles       Umbilical arteries  Cerebellum  Bladder    Cisterna magna  Aortic arch    Face    Nuchal fold  Diaphragm    Profile    Upper extremities  Stomach    Nose/lips   Lower extremities  ACI     PCI    Choroid plexus    Suboptimal anatomy seen: RVOT, diaphragm    Abnormal anatomy seen: The fetal genitalia is noted to be Male. IMPRESSION:  Single living IUP. No gross structural abnormalities are visualized.  Amniotic fluid volume is subjectively normal.    Assessment/Plan:    Problem List Items Addressed This Visit     None      Visit Diagnoses     Prenatal care, second trimester    -  Primary    Pelvic cramping        Other chest pain        Relevant Orders    EKG 12 lead          Dispo: RTC in 4 weeks  Delilah Aggarwal,

## 2021-10-14 RX ORDER — CHOLECALCIFEROL (VITAMIN D3) 25 MCG
TABLET,CHEWABLE ORAL
Qty: 30 CAPSULE | Refills: 8 | Status: SHIPPED | OUTPATIENT
Start: 2021-10-14 | End: 2022-05-02

## 2021-10-18 ENCOUNTER — OFFICE VISIT (OUTPATIENT)
Dept: OBGYN CLINIC | Age: 31
End: 2021-10-18
Payer: COMMERCIAL

## 2021-10-18 ENCOUNTER — ROUTINE PRENATAL (OUTPATIENT)
Dept: OBGYN CLINIC | Age: 31
End: 2021-10-18

## 2021-10-18 VITALS
HEART RATE: 117 BPM | SYSTOLIC BLOOD PRESSURE: 120 MMHG | WEIGHT: 167.4 LBS | DIASTOLIC BLOOD PRESSURE: 70 MMHG | BODY MASS INDEX: 28.73 KG/M2

## 2021-10-18 DIAGNOSIS — Z34.92 PRENATAL CARE, SECOND TRIMESTER: Primary | ICD-10-CM

## 2021-10-18 DIAGNOSIS — Z34.93 PRENATAL CARE IN THIRD TRIMESTER: Primary | ICD-10-CM

## 2021-10-18 PROCEDURE — 76816 OB US FOLLOW-UP PER FETUS: CPT | Performed by: OBSTETRICS & GYNECOLOGY

## 2021-10-18 PROCEDURE — 0502F SUBSEQUENT PRENATAL CARE: CPT | Performed by: OBSTETRICS & GYNECOLOGY

## 2021-10-18 NOTE — PROGRESS NOTES
27 y.o.  at 23w3d EGA Estimated Date of Delivery: 22 here for DANIEL:     Pt seen and examined. No concerns/complaints. Denies VB, LOF, CTX. Endorses (+) FM. Denies fevers / chills / chest pain / shortness of breath. Denies HA, changes with vision, RUQ pain, edema. MWQ reviewed. Here for repeat anatomy images. Objective:  /70   Pulse 117   Wt 167 lb 6.4 oz (75.9 kg)   LMP 2021   BMI 28.73 kg/m²   Gen: AO, NAD  Abd: Soft, NT, gravid   Ext: Mild LE edema  OMM: Increased lumbar lordosis    Images:  OBSTETRICAL ULTRASOUND GROWTH    DATE: 10/18/2021    PHYSICIAN: GLORY Roberts.     SONOGRAPHER: Yazan Olmos RDMS    INDICATION: Growth,     TYPE OF SCAN: abdominal    FINDINGS:  A single viable intrauterine pregnancy is noted in cephalic presentation. Cardiac and somatic activity are noted. The following values were obtained:   Fetal heart rate    148bpm   BPD      5.58cm 29.5 %   Head Circumference    21.16cm 26.4 %    Abdominal Circumference   19.59cm 68.6 %   Femur Length     4.27cm 55.6 %   Amniotic fluid index    4.54cm   EFW      649g  67.7 percentile    Amniotic fluid volume is second. Based on sonographic criteria the estimated fetal age is 23weeks and 4days with EDC of 02/10/2022. There is a 1 day discordance with the established EDC of 2022. The patient has an anterior placenta that is adequate distance in relation to the internal cervical os. The evaluation of the lower uterine segment and cervix reveals normal appearing anatomy. The uterus is unremarkable/gravid. Maternal ovaries and adnexae are not well visualized due to the size of the uterus and patient's gravid state. Anatomy seen includes: heart, stomach, kidneys, bladder, RVOT, Diaphragm. IMPRESSION:  Single live IUP in the seconde trimester. Adequate interval fetal growth. Assessment/Plan:   Diagnosis Orders   1.  Prenatal care, second trimester         Reassuring fetal / maternal status today.   Aneuploidy / Carrier Screen: Mat Quad Negative     AFP: Neg   PNL: A+/ab (-), RI, HepBnR,HepCnR, HIVnR, RPRnR, Varicella Immune, Hgb 11.7, Plt 270, UDS neg, UCx neg, GCCT neg     Anatomy: male, sub-optimal RVOT / diaphragm. Post Placenta. CL 5.07cm.  Rpt today, completed anatomy EFW 67%ile. Normal fluid.  28 wk labs:    Tdap: at 28 wks     GBS: 35-36 wks      Follow Up  Return OB precautions reviewed   Return in about 4 weeks (around 11/15/2021) for Return OB visit.     Derral Patient, DO

## 2021-10-21 ENCOUNTER — TELEPHONE (OUTPATIENT)
Dept: FAMILY MEDICINE CLINIC | Age: 31
End: 2021-10-21

## 2021-10-21 NOTE — TELEPHONE ENCOUNTER
LM to call her insurance to see what doctors are available for her to call.  Then to let us know who it is if she needs a referral

## 2021-10-21 NOTE — TELEPHONE ENCOUNTER
Pt called asking for a referral to a new OB/GYN she is not happy with the amount of time she get at her current OB with her history. She has been going to [facility] (Dr Matthew Pugh and Dr Juan Harris). She does NOT want to be referred to [facility] either. Pt is very stressed out. Please advise.

## 2021-11-15 ENCOUNTER — ROUTINE PRENATAL (OUTPATIENT)
Dept: OBGYN CLINIC | Age: 31
End: 2021-11-15
Payer: COMMERCIAL

## 2021-11-15 VITALS
SYSTOLIC BLOOD PRESSURE: 120 MMHG | WEIGHT: 168.2 LBS | HEART RATE: 85 BPM | DIASTOLIC BLOOD PRESSURE: 74 MMHG | BODY MASS INDEX: 28.87 KG/M2

## 2021-11-15 DIAGNOSIS — O26.893 PREGNANCY HEADACHE IN THIRD TRIMESTER: ICD-10-CM

## 2021-11-15 DIAGNOSIS — F41.9 ANXIETY DURING PREGNANCY: ICD-10-CM

## 2021-11-15 DIAGNOSIS — Z34.93 PRENATAL CARE IN THIRD TRIMESTER: Primary | ICD-10-CM

## 2021-11-15 DIAGNOSIS — F41.8 ANXIETY ABOUT HEALTH: ICD-10-CM

## 2021-11-15 DIAGNOSIS — O99.340 ANXIETY DURING PREGNANCY: ICD-10-CM

## 2021-11-15 DIAGNOSIS — M54.9 BACK PAIN AFFECTING PREGNANCY IN THIRD TRIMESTER: ICD-10-CM

## 2021-11-15 DIAGNOSIS — O99.891 BACK PAIN AFFECTING PREGNANCY IN THIRD TRIMESTER: ICD-10-CM

## 2021-11-15 DIAGNOSIS — R51.9 PREGNANCY HEADACHE IN THIRD TRIMESTER: ICD-10-CM

## 2021-11-15 DIAGNOSIS — O21.9 NAUSEA AND VOMITING IN PREGNANCY: ICD-10-CM

## 2021-11-15 PROCEDURE — 0502F SUBSEQUENT PRENATAL CARE: CPT | Performed by: OBSTETRICS & GYNECOLOGY

## 2021-11-15 PROCEDURE — 36415 COLL VENOUS BLD VENIPUNCTURE: CPT | Performed by: OBSTETRICS & GYNECOLOGY

## 2021-11-15 NOTE — PROGRESS NOTES
.eastobgynglucose    Patient started drink at 346  and finished at 349   . Patient tolerated drink well.   1 green tube drawn at 449 . # 50 grams of Glucola. No complain of nausea and vomiting at this time, will continue to monitor.

## 2021-11-15 NOTE — PROGRESS NOTES
27 y.o.  at 27w3d EGA Estimated Date of Delivery: 22 here for DANIEL:     Pt seen and examined. We had a long discussion about her care in pregnancy -- she has healthcare related anxiety. She has started using a , which I think will help. She is also contemplating a home birth -- we reviewed that in this instance she would need to transfer care to a practice that will facilitate this for her. She has started keeping track of questions and we plan to review these at each visit. She admits she has trouble discussing concerns at the visit and the note cards help. She was unable to meet with counselor last week due to a work conflict. She says her work has been causing an increase in stress -- she is now working 5 days a week, 10 hrs a day. She is unable to make her apts working these hours. She denies VB, LOF, CTX. Endorses (+) FM. Denies fevers / chills / chest pain / shortness of breath. Denies HA, changes with vision, RUQ pain, edema. MWQ reviewed. Tdap given. GCT given today. Objective:  /74   Pulse 85   Wt 168 lb 3.2 oz (76.3 kg)   LMP 2021   BMI 28.87 kg/m²   Gen: AO, NAD  Abd: Soft, NT, gravid   Ext: Mild LE edema  OMM: Increased lumbar lordosis    Assessment/Plan:   Diagnosis Orders   1. Prenatal care in third trimester  GLUCOSE CHALLENGE GESTATIONAL    CBC Auto Differential   2. Anxiety during pregnancy     3. Anxiety about health     4. Back pain affecting pregnancy in third trimester     5. Pregnancy headache in third trimester     6. Nausea and vomiting in pregnancy         Prenatal Care, first pregnancy   · Reassuring fetal / maternal status today. · Aneuploidy / Carrier Screen: Mat Quad Negative    · AFP: Neg  · PNL: A+/ab (-), RI, HepBnR,HepCnR, HIVnR, RPRnR, Varicella Immune, Hgb 11.7, Plt 270, UDS neg, UCx neg, GCCT neg    · Anatomy: male, sub-optimal RVOT / diaphragm. Post Placenta. CL 5.07cm. · Rpt: completed anatomy EFW 67%ile. Normal fluid.    · 28 wk

## 2021-11-16 LAB
BASOPHILS ABSOLUTE: 0.1 K/UL (ref 0–0.2)
BASOPHILS RELATIVE PERCENT: 0.5 %
EOSINOPHILS ABSOLUTE: 0.1 K/UL (ref 0–0.6)
EOSINOPHILS RELATIVE PERCENT: 0.8 %
GLUCOSE CHALLENGE: 123 MG/DL
HCT VFR BLD CALC: 32.8 % (ref 36–48)
HEMOGLOBIN: 10.8 G/DL (ref 12–16)
LYMPHOCYTES ABSOLUTE: 2 K/UL (ref 1–5.1)
LYMPHOCYTES RELATIVE PERCENT: 17.5 %
MCH RBC QN AUTO: 27.5 PG (ref 26–34)
MCHC RBC AUTO-ENTMCNC: 32.9 G/DL (ref 31–36)
MCV RBC AUTO: 83.8 FL (ref 80–100)
MONOCYTES ABSOLUTE: 0.5 K/UL (ref 0–1.3)
MONOCYTES RELATIVE PERCENT: 4.3 %
NEUTROPHILS ABSOLUTE: 9 K/UL (ref 1.7–7.7)
NEUTROPHILS RELATIVE PERCENT: 76.9 %
PDW BLD-RTO: 12.9 % (ref 12.4–15.4)
PLATELET # BLD: 265 K/UL (ref 135–450)
PMV BLD AUTO: 7.9 FL (ref 5–10.5)
RBC # BLD: 3.91 M/UL (ref 4–5.2)
WBC # BLD: 11.6 K/UL (ref 4–11)

## 2021-12-06 ENCOUNTER — OFFICE VISIT (OUTPATIENT)
Dept: FAMILY MEDICINE CLINIC | Age: 31
End: 2021-12-06
Payer: COMMERCIAL

## 2021-12-06 ENCOUNTER — OFFICE VISIT (OUTPATIENT)
Dept: PSYCHOLOGY | Age: 31
End: 2021-12-06
Payer: COMMERCIAL

## 2021-12-06 ENCOUNTER — ROUTINE PRENATAL (OUTPATIENT)
Dept: OBGYN CLINIC | Age: 31
End: 2021-12-06

## 2021-12-06 VITALS
WEIGHT: 171.6 LBS | BODY MASS INDEX: 29.46 KG/M2 | SYSTOLIC BLOOD PRESSURE: 116 MMHG | DIASTOLIC BLOOD PRESSURE: 78 MMHG | HEART RATE: 102 BPM

## 2021-12-06 VITALS
BODY MASS INDEX: 29.01 KG/M2 | TEMPERATURE: 97.4 F | RESPIRATION RATE: 16 BRPM | HEART RATE: 106 BPM | OXYGEN SATURATION: 98 % | SYSTOLIC BLOOD PRESSURE: 108 MMHG | WEIGHT: 169 LBS | DIASTOLIC BLOOD PRESSURE: 68 MMHG

## 2021-12-06 DIAGNOSIS — O99.340 ANXIETY DURING PREGNANCY: ICD-10-CM

## 2021-12-06 DIAGNOSIS — H69.80 DYSFUNCTION OF EUSTACHIAN TUBE, UNSPECIFIED LATERALITY: Primary | ICD-10-CM

## 2021-12-06 DIAGNOSIS — O26.893 PREGNANCY HEADACHE IN THIRD TRIMESTER: ICD-10-CM

## 2021-12-06 DIAGNOSIS — O99.891 BACK PAIN AFFECTING PREGNANCY IN THIRD TRIMESTER: ICD-10-CM

## 2021-12-06 DIAGNOSIS — F41.9 ANXIETY: ICD-10-CM

## 2021-12-06 DIAGNOSIS — N94.9 ROUND LIGAMENT PAIN: ICD-10-CM

## 2021-12-06 DIAGNOSIS — Z34.93 PRENATAL CARE IN THIRD TRIMESTER: Primary | ICD-10-CM

## 2021-12-06 DIAGNOSIS — F32.A DEPRESSION AFFECTING PREGNANCY: ICD-10-CM

## 2021-12-06 DIAGNOSIS — O99.340 DEPRESSION AFFECTING PREGNANCY: ICD-10-CM

## 2021-12-06 DIAGNOSIS — M54.9 BACK PAIN AFFECTING PREGNANCY IN THIRD TRIMESTER: ICD-10-CM

## 2021-12-06 DIAGNOSIS — R51.9 PREGNANCY HEADACHE IN THIRD TRIMESTER: ICD-10-CM

## 2021-12-06 DIAGNOSIS — F33.1 MDD (MAJOR DEPRESSIVE DISORDER), RECURRENT EPISODE, MODERATE (HCC): Primary | ICD-10-CM

## 2021-12-06 DIAGNOSIS — F41.8 ANXIETY ABOUT HEALTH: ICD-10-CM

## 2021-12-06 DIAGNOSIS — F41.9 ANXIETY DURING PREGNANCY: ICD-10-CM

## 2021-12-06 DIAGNOSIS — O21.9 NAUSEA AND VOMITING IN PREGNANCY: ICD-10-CM

## 2021-12-06 PROBLEM — R45.89 ANXIETY ABOUT HEALTH: Status: ACTIVE | Noted: 2021-12-06

## 2021-12-06 PROCEDURE — 0502F SUBSEQUENT PRENATAL CARE: CPT | Performed by: OBSTETRICS & GYNECOLOGY

## 2021-12-06 PROCEDURE — 99213 OFFICE O/P EST LOW 20 MIN: CPT | Performed by: FAMILY MEDICINE

## 2021-12-06 PROCEDURE — 90791 PSYCH DIAGNOSTIC EVALUATION: CPT | Performed by: SOCIAL WORKER

## 2021-12-06 SDOH — ECONOMIC STABILITY: HOUSING INSECURITY
IN THE LAST 12 MONTHS, WAS THERE A TIME WHEN YOU DID NOT HAVE A STEADY PLACE TO SLEEP OR SLEPT IN A SHELTER (INCLUDING NOW)?: NO

## 2021-12-06 SDOH — ECONOMIC STABILITY: FOOD INSECURITY: WITHIN THE PAST 12 MONTHS, THE FOOD YOU BOUGHT JUST DIDN'T LAST AND YOU DIDN'T HAVE MONEY TO GET MORE.: NEVER TRUE

## 2021-12-06 SDOH — ECONOMIC STABILITY: TRANSPORTATION INSECURITY
IN THE PAST 12 MONTHS, HAS LACK OF TRANSPORTATION KEPT YOU FROM MEETINGS, WORK, OR FROM GETTING THINGS NEEDED FOR DAILY LIVING?: NO

## 2021-12-06 SDOH — ECONOMIC STABILITY: TRANSPORTATION INSECURITY
IN THE PAST 12 MONTHS, HAS THE LACK OF TRANSPORTATION KEPT YOU FROM MEDICAL APPOINTMENTS OR FROM GETTING MEDICATIONS?: NO

## 2021-12-06 SDOH — ECONOMIC STABILITY: FOOD INSECURITY: WITHIN THE PAST 12 MONTHS, YOU WORRIED THAT YOUR FOOD WOULD RUN OUT BEFORE YOU GOT MONEY TO BUY MORE.: NEVER TRUE

## 2021-12-06 SDOH — ECONOMIC STABILITY: INCOME INSECURITY: IN THE LAST 12 MONTHS, WAS THERE A TIME WHEN YOU WERE NOT ABLE TO PAY THE MORTGAGE OR RENT ON TIME?: NO

## 2021-12-06 ASSESSMENT — PATIENT HEALTH QUESTIONNAIRE - PHQ9
SUM OF ALL RESPONSES TO PHQ QUESTIONS 1-9: 0
1. LITTLE INTEREST OR PLEASURE IN DOING THINGS: 0
2. FEELING DOWN, DEPRESSED OR HOPELESS: 0
SUM OF ALL RESPONSES TO PHQ QUESTIONS 1-9: 0
SUM OF ALL RESPONSES TO PHQ9 QUESTIONS 1 & 2: 0
SUM OF ALL RESPONSES TO PHQ QUESTIONS 1-9: 0

## 2021-12-06 ASSESSMENT — SOCIAL DETERMINANTS OF HEALTH (SDOH): HOW HARD IS IT FOR YOU TO PAY FOR THE VERY BASICS LIKE FOOD, HOUSING, MEDICAL CARE, AND HEATING?: NOT HARD AT ALL

## 2021-12-06 ASSESSMENT — ENCOUNTER SYMPTOMS
WHEEZING: 0
SORE THROAT: 0
TROUBLE SWALLOWING: 0
COUGH: 0
SHORTNESS OF BREATH: 0
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
## 2021-12-06 NOTE — Clinical Note
Thanks for the referral Dr. Kim Masters. Going to send the patient over to the preventative  group with Dr. Apollo Wheatley, and also gave her referral to [facility] for more intense mental health treatment. I think she needs trauma informed care.

## 2021-12-06 NOTE — PROGRESS NOTES
27 y.o.  at [address] [facility] Estimated Date of Delivery: 22 here for DANIEL:     Pt seen and examined. We had a long discussion about her pregnancy -- she has healthcare related anxiety. She admits to difficulty answering and asking questions. She has been refusing to fill out the [address], today I explained why we do this and she verbalized understanding. She is concerned about not getting enough \"resources\" -- I offered patient a same-day visit with our counselor and she states she already has an apt today! Her MWQ was 25 -- she denies S/H/I but admits to depression and anxiety surrounding pregnancy. She has started using a , which I think will help. She is also contemplating a home birth -- we reviewed that in this instance she would need to transfer care to a practice that will facilitate this for her. She does not want to risk that at this time. She has started keeping track of questions and we plan to review these at each visit. She admits she has trouble discussing concerns at the visit and the note cards help. Her adjusted work hours are helping with stress. She denies VB, LOF, CTX. Endorses (+) FM. Denies fevers / chills / chest pain / shortness of breath. Denies HA, changes with vision, RUQ pain, edema. MWQ reviewed. 28 wk labs reassuring -- I addressed each value per pt request.  I also addressed why pt never leaves a urine for us -- she states she \"does not have to pee\", I reviewed that this is important for evaluating her glucose and protein -- she declines again today. Objective:  /78   Pulse 102   Wt 171 lb 9.6 oz (77.8 kg)   LMP 2021   BMI 29.46 kg/m²   Gen: AO, NAD  Abd: Soft, NT, gravid   Ext: Mild LE edema  OMM: Increased lumbar lordosis    Assessment/Plan:   Diagnosis Orders   1. Prenatal care in third trimester     2. Anxiety during pregnancy     3. Anxiety about health     4. Back pain affecting pregnancy in third trimester     5.  Pregnancy headache in third trimester     6. Nausea and vomiting in pregnancy     7. Round ligament pain     8. Depression affecting pregnancy         Prenatal Care, first pregnancy   · Reassuring fetal / maternal status today. · Aneuploidy / Carrier Screen: Mat Quad Negative    · AFP: Neg  · PNL: A+/ab (-), RI, HepBnR,HepCnR, HIVnR, RPRnR, Varicella Immune, Hgb 11.7, Plt 270, UDS neg, UCx neg, GCCT neg    · Anatomy: male, sub-optimal RVOT / diaphragm. Post Placenta. CL 5.07cm. · Rpt: completed anatomy EFW 67%ile. Normal fluid. · 28 wk labs:  / Hgb 10.8 /    · Tdap: given 11/15/21     · GBS: 36 wks      Anxiety / depression in pregnancy   · Recommend counseling, missed last apt but is scheduled to meet with Viviana Pace today  · Will keep in close contact with patient     Back pain  · Recommend chiropractor as needed      Nausea / Vomiting (IMPROVING)     Follow Up  Return OB precautions reviewed   Return in about 2 weeks (around 12/20/2021) for Return OB visit. Approximately 45 minutes spent in room counseling patient on condition and coordination of care with over 50% in direct face to face counseling.      See Amador DO

## 2021-12-06 NOTE — PATIENT INSTRUCTIONS
Use saline nasal spray- 2-3 times a day    If after one week,not helping,  Advise me and I will send a prescription

## 2021-12-06 NOTE — PATIENT INSTRUCTIONS
[address] [place] [address] [phone]  2. I will have Dr. Rylee Cherry reach out to  You  [email or web address]  3.  youtube \"the power of vulnerability\" and \"listening to shame\"  4. Try the handout on PMR  5. Return to see [facility] in 4 weeks. Progressive muscle relaxation (PMR) is an exercise that anyone can use to alleviate disturbing and disruptive emotional symptoms such as anxiety or insomnia. Like breathing exercises, visualization, and yoga, PMR is considered a relaxation technique. It's especially helpful in moments of high stress or nervousness, and even can help someone get through a panic attack. History of PMR  PMR was developed by an [place] physician, Nicola Schwarz, in the 1920s. Griselda Gutierrez noted that regardless of their illness, the majority of his patients suffered from muscle pain and tension. When he suggested that they relax, he noticed that most people didn't seem connected enough to their physical tension to release it. This inspired Griselda Gutierrez to develop a sequence of steps for tightening and then relaxing groups of muscles. He found this allowed his patients to become more aware of their tension, to learn how to let go of it, and to recognize what it feels like to be in a relaxed state. Since then the technique has been modified many times but all modern variations of PMR are based on Chiquita original idea of systematically squeezing and then releasing isolated muscle groups. Does It Work--and How? PMR works in part by helping to overcome a normal reaction to stress known as the flight-or-fight response. In evolutionary terms, this reaction developed as a way to help animals survive a threat--either by running away or by meeting the opposition head-on. Over time the flight-or-fight response has become a common reaction to feelings of fear that often are out of proportion with reality.       Unfortunately, when it's not needed for actual survival, the flight-or-fight reaction tends to bring on many uncomfortable physical symptoms, including accelerated heart rate, sweating, shaking, and shortness of breath--largely the product of an influx of stress hormones. Also, muscle pain, tension, and stiffness are common symptoms brought on by stress and anxiety. Relaxation techniques, including PMR, have the reverse effect on the body, eliciting the relaxation response, lowering heart rate, calming the mind, and reducing bodily tension. PMR also can help a person become more aware of how their physical stress may be contributing to their emotional state. By relaxing the body, a person may be able to let go of anxious thoughts and feelings. PMR Step-by-Step  For a quick taste of how PMR works, squeeze one of your fists as hard as you can. Notice how tight your fingers and forearm feel. Count to ten and then release the clinch. Allow your hand to relax completely and let go of any tension. Let your hand go limp and notice how relaxed it feels now compared to before your clinched your fist.       This methodical approach to increasing and releasing tension throughout your body is the linchpin of PMR: By systematically constricting and releasing various muscle groups it is possible to relieve physical stress and quiet and calm the mind. Here are the steps for one version of PMR that anyone can do. Try it next time you're feeling nervous, anxious, or find yourself tossing, turning, and unable to sleep. Step 1    Get comfortable. You don't have to lie down to do PMR; it will work if you're sitting up in a chair. Do make sure you're in a place that's free of distraction. Close your eyes if that feels best for you. Step 2    Breathe. Inhale deeply through your nose, feeling your abdomen rise as you fill your body with air. Then slowly exhale from your mouth, drawing your navel toward your spine. Repeat three to five times. Step 3    Starting with your feet, tighten and release your muscles. Clench your toes and pressing your heels toward the ground. Squeeze tightly for a few breaths and then release. Now flex your feet in, pointing your toes up towards your head. Hold for a few seconds and then release. Step 4    Continue to work your way up your body, tightening and releasing each muscle group. Work your way up in this order: legs, glutes, abdomen, back, hands, arms, shoulders, neck, and face. Try to tighten each muscle group for a few breaths and then slowly release. Repeat any areas that feel especially stiff. Step 5     End the practice by taking a few more deep breaths, noting how much more calm and relaxed you feel. PMR is a skill, one that takes practice to master. In order to be able to draw on PMR when you need it--in other words, when you're truly in a stressful or anxiety-provoking situation--you'll want to learn how to do it while you aren't under pressure. Practice PMR several times a week to become aware of what it's like to feel relaxed. Understanding this feeling can help you to more readily let go of tension when anxiety rises.

## 2021-12-06 NOTE — PROGRESS NOTES
Subjective:      Patient ID: Emmanuel Garcia is a 27 y.o. y.o. female. Has noise / ringing in her right ear,  For a while  Some congested feeling. Uses Singulair and Claritin for allergy sx. Has not had much help with nose sprays. occ off balance feeling,  occ positional    3 rd Trimester preg. Due Feb  Has a lot of work stress with long hours and they wanting five days,  OB has limit of 4 ten hour days. HPI    Was seen by psychologist today   Is referring her to another. Pt does not want medication for therapy.'    Chief Complaint   Patient presents with    Mental Health Problem    Stress    Anxiety    Tinnitus     hearing sounds in R ear x couple months, gets worse off & on       Allergies   Allergen Reactions    Pertussis Vaccines        Past Medical History:   Diagnosis Date    Asthma     asthmatic bronchitis treatment.   not dx asthma    Autism     Generalized headaches        Past Surgical History:   Procedure Laterality Date    UPPER GASTROINTESTINAL ENDOSCOPY  04/13/2018    Esophagitis    WISDOM TOOTH EXTRACTION         Social History     Socioeconomic History    Marital status:      Spouse name: Not on file    Number of children: Not on file    Years of education: Not on file    Highest education level: Not on file   Occupational History    Not on file   Tobacco Use    Smoking status: Never Smoker    Smokeless tobacco: Never Used   Vaping Use    Vaping Use: Never used   Substance and Sexual Activity    Alcohol use: Not Currently     Alcohol/week: 0.0 standard drinks     Comment: occasionally    Drug use: No    Sexual activity: Yes     Partners: Female   Other Topics Concern    Not on file   Social History Narrative    Not on file     Social Determinants of Health     Financial Resource Strain: Low Risk     Difficulty of Paying Living Expenses: Not hard at all   Food Insecurity: No Food Insecurity    Worried About [employer] [email or web address] in the Last Year: Never true    Ran Out of Food in the Last Year: Never true   Transportation Needs: No Transportation Needs    Lack of Transportation (Medical): No    Lack of Transportation (Non-Medical): No   Physical Activity:     Days of Exercise per Week: Not on file    Minutes of Exercise per Session: Not on file   Stress:     Feeling of Stress : Not on file   Social Connections:     Frequency of Communication with Friends and Family: Not on file    Frequency of Social Gatherings with Friends and Family: Not on file    Attends Taoism Services: Not on file    Active Member of [address] [place] [address] Twice or Organizations: Not on file    Attends Club or Organization Meetings: Not on file    Marital Status: Not on file   Intimate Partner Violence:     Fear of Current or Ex-Partner: Not on file    Emotionally Abused: Not on file    Physically Abused: Not on file    Sexually Abused: Not on file   Housing Stability: Unknown    Unable to Pay for Housing in the Last Year: No    Number of [place] in the Last Year: Not on file    Unstable Housing in the Last Year: No       Family History   Problem Relation Age of Onset    Diabetes Mother     Diabetes Father     Heart Disease Maternal Grandmother     Cancer Maternal Grandfather         colon cancer    Heart Disease Maternal Grandfather        Vitals:    12/06/21 1457   BP: 108/68   Pulse: 106   Resp: 16   Temp: 97.4 °F (36.3 °C)   SpO2: 98%       Wt Readings from Last 3 Encounters:   12/06/21 169 lb (76.7 kg)   12/06/21 171 lb 9.6 oz (77.8 kg)   11/15/21 168 lb 3.2 oz (76.3 kg)       Review of Systems   Constitutional: Negative for chills and fever. HENT: Negative for sore throat and trouble swallowing. Right ear sx   Respiratory: Negative for cough, shortness of breath and wheezing. Neurological: Negative for dizziness, seizures, facial asymmetry, speech difficulty and numbness. Objective:   Physical Exam  Vitals reviewed.    Constitutional:       Appearance: She is not ill-appearing. HENT:      Right Ear: Tympanic membrane normal.      Left Ear: Tympanic membrane normal.      Ears:      Comments: Grossly normal TM's     Nose:      Comments: Turbinates boggy  Eyes:      General: No scleral icterus. Pupils: Pupils are equal, round, and reactive to light. Pulmonary:      Effort: Pulmonary effort is normal. No respiratory distress. Breath sounds: Normal breath sounds. Lymphadenopathy:      Cervical: No cervical adenopathy. Neurological:      General: No focal deficit present. Mental Status: She is alert and oriented to person, place, and time. Assessment:      eustachian tube dysfunction  Pregnancy  Adjustment disorder    Plan:     Discussed possible meds   Try Saline NS. Consider Atrovent NS added on if needed. ..advise me 5-7 days  Continue with obstetrics  Continue with referral to counseling. Current Outpatient Medications   Medication Sig Dispense Refill    Ferrous Sulfate (IRON PO) Take by mouth      Prenatal Vit-Fe Fum-FA-Omega (PRENATAL MULTI +DHA) 27-0.8-228 MG CAPS TAKE 1 CAPSULE BY MOUTH EVERY DAY 30 capsule 8    montelukast (SINGULAIR) 10 MG tablet TAKE 1 TABLET BY MOUTH EVERY DAY AT NIGHT 90 tablet 1    albuterol sulfate HFA (PROVENTIL HFA) 108 (90 Base) MCG/ACT inhaler Inhale 2 puffs into the lungs every 4 hours as needed for Wheezing 1 Inhaler 0     No current facility-administered medications for this visit.

## 2021-12-09 ENCOUNTER — TELEPHONE (OUTPATIENT)
Dept: OBGYN CLINIC | Age: 31
End: 2021-12-09

## 2021-12-09 NOTE — TELEPHONE ENCOUNTER
Please ask her where she would like to go and we will gladly send a referral! We are sorry to lose her! Is she doing well with [facility]?      Toney

## 2021-12-10 NOTE — TELEPHONE ENCOUNTER
[phone] ([place])     Placed call to patient, no answer, left VM to return call to office.

## 2021-12-15 ENCOUNTER — TELEMEDICINE (OUTPATIENT)
Dept: FAMILY MEDICINE CLINIC | Age: 31
End: 2021-12-15
Payer: COMMERCIAL

## 2021-12-15 ENCOUNTER — TELEPHONE (OUTPATIENT)
Dept: FAMILY MEDICINE CLINIC | Age: 31
End: 2021-12-15

## 2021-12-15 DIAGNOSIS — J06.9 VIRAL URI: Primary | ICD-10-CM

## 2021-12-15 DIAGNOSIS — Z3A.25 25 WEEKS GESTATION OF PREGNANCY: ICD-10-CM

## 2021-12-15 PROCEDURE — 99213 OFFICE O/P EST LOW 20 MIN: CPT | Performed by: INTERNAL MEDICINE

## 2021-12-15 NOTE — PROGRESS NOTES
Ramesh Alvarez (:  1990) is a 27 y.o. female,Established patient, here for evaluation of the following chief complaint(s): URI (nasal congestion, dry cough, nasal drainage, chills, no fever, SOB with cough x 5 days (COVID negative on 21) and Routine Prenatal Visit (patient is 6.5 months pregnant)         ASSESSMENT/PLAN:  Viral URI  --rapid influenza A and B test     Return if symptoms worsen  or  fail to improve       SUBJECTIVE/OBJECTIVE:  HPI- URI  Started  5 days  Ago with nasal congestion and drainage, dry non-productive cough,  No chills, fever or body aches,  She does have some shortness of breath  With coughing. Is 6.5 months pregnant. Was tested  For Covid  2021 and was negative.  has the flu and she is concerned she has the flu ass well.   Will do  Flu test     Review of Systems- Unremarkable except for what is noted in the HPI    Patient-Reported Vitals 12/15/2021   Patient-Reported Weight 171lb   Patient-Reported Height 4f11   Patient-Reported Temperature 98.3        Physical Exam    [INSTRUCTIONS:  \"[x]\" Indicates a positive item  \"[]\" Indicates a negative item  -- DELETE ALL ITEMS NOT EXAMINED]    Constitutional: [x] Appears well-developed and well-nourished [x] No apparent distress      [] Abnormal -     Mental status: [x] Alert and awake  [x] Oriented to person/place/time [x] Able to follow commands    [] Abnormal -     Eyes:   EOM    [x]  Normal    [] Abnormal -   Sclera  [x]  Normal    [] Abnormal -          Discharge [x]  None visible   [] Abnormal -     HENT: [x] Normocephalic, atraumatic  [] Abnormal -   [x] Mouth/Throat: Mucous membranes are moist    External Ears [x] Normal  [] Abnormal -    Neck: [x] No visualized mass [] Abnormal -     Pulmonary/Chest: [x] Respiratory effort normal   [x] No visualized signs of difficulty breathing or respiratory distress        [] Abnormal -      Musculoskeletal:   [x] Normal gait with no signs of ataxia         [x] Normal range of motion of neck        [] Abnormal -     Neurological:        [x] No Facial Asymmetry (Cranial nerve 7 motor function) (limited exam due to video visit)          [x] No gaze palsy        [] Abnormal -          Skin:        [x] No significant exanthematous lesions or discoloration noted on facial skin         [] Abnormal -            Psychiatric:       [x] Normal Affect [] Abnormal -        [x] No Hallucinations    Other pertinent observable physical exam findings:-    Instruction:  -flu test   -stay hydrated   -symptomatic treatment    Face to face (virtual) with the patient discussing the diagnosis and importance of compliance with the treatment plan as well as documenting on the day of the visit. Return if symptoms worsen  or  fail to improve      Christy Hunt was evaluated through a synchronous (real-time) audio-video encounter. The patient (or guardian if applicable) is aware that this is a billable service. Verbal consent to proceed has been obtained within the past 12 months. The visit was conducted pursuant to the emergency declaration under the [address] [place] [address] authority and the [employer] General Act. Patient identification was verified, and a caregiver was present when appropriate. The patient was located in a state where the provider was credentialed to provide care. An electronic signature was used to authenticate this note.     --Lord Concepcion MD

## 2021-12-15 NOTE — TELEPHONE ENCOUNTER
Left message to call office back for further instructions . Dr Finesse Moss would like patient to do strep culture .

## 2021-12-16 LAB
RAPID INFLUENZA  B AGN: NEGATIVE
RAPID INFLUENZA A AGN: NEGATIVE

## 2021-12-20 ENCOUNTER — ROUTINE PRENATAL (OUTPATIENT)
Dept: OBGYN CLINIC | Age: 31
End: 2021-12-20

## 2021-12-20 VITALS
SYSTOLIC BLOOD PRESSURE: 110 MMHG | WEIGHT: 169 LBS | HEART RATE: 98 BPM | BODY MASS INDEX: 29.01 KG/M2 | DIASTOLIC BLOOD PRESSURE: 60 MMHG

## 2021-12-20 DIAGNOSIS — O21.9 NAUSEA AND VOMITING IN PREGNANCY: ICD-10-CM

## 2021-12-20 DIAGNOSIS — F32.A DEPRESSION AFFECTING PREGNANCY: ICD-10-CM

## 2021-12-20 DIAGNOSIS — O99.340 ANXIETY DURING PREGNANCY: ICD-10-CM

## 2021-12-20 DIAGNOSIS — F41.9 ANXIETY DURING PREGNANCY: ICD-10-CM

## 2021-12-20 DIAGNOSIS — M54.9 BACK PAIN IN PREGNANCY: ICD-10-CM

## 2021-12-20 DIAGNOSIS — O99.340 DEPRESSION AFFECTING PREGNANCY: ICD-10-CM

## 2021-12-20 DIAGNOSIS — O99.891 BACK PAIN IN PREGNANCY: ICD-10-CM

## 2021-12-20 DIAGNOSIS — Z34.93 PRENATAL CARE IN THIRD TRIMESTER: Primary | ICD-10-CM

## 2021-12-20 DIAGNOSIS — Z3A.32 32 WEEKS GESTATION OF PREGNANCY: ICD-10-CM

## 2021-12-20 PROCEDURE — 0502F SUBSEQUENT PRENATAL CARE: CPT | Performed by: OBSTETRICS & GYNECOLOGY

## 2021-12-20 RX ORDER — HYDROXYZINE PAMOATE 25 MG/1
25 CAPSULE ORAL 3 TIMES DAILY PRN
Qty: 30 CAPSULE | Refills: 0 | Status: SHIPPED | OUTPATIENT
Start: 2021-12-20 | End: 2022-01-19

## 2021-12-20 RX ORDER — FAMOTIDINE 20 MG/1
20 TABLET, FILM COATED ORAL 2 TIMES DAILY PRN
Qty: 60 TABLET | Refills: 0 | Status: SHIPPED | OUTPATIENT
Start: 2021-12-20 | End: 2022-01-13

## 2021-12-20 NOTE — PROGRESS NOTES
TEMP-98.4 INFRARED    Maternal emotional well being screening form completed and reviewed with patient. Current score is 24.    Patient given referral to [address] [place] [address] ([phone]): Yes

## 2021-12-21 ENCOUNTER — PATIENT MESSAGE (OUTPATIENT)
Dept: PSYCHOLOGY | Age: 31
End: 2021-12-21

## 2021-12-21 NOTE — PROGRESS NOTES
Return OB Office Visit    CC:   Chief Complaint   Patient presents with    Routine Prenatal Visit       HPI:  Patient seen and examined. Patient is overall doing well. Denies VB, LOF. Reports mild cramping, denies contractions. +FM. Denies headaches, vision changes, RUQ pain, increased LE edema. Denies chest pain, shortness of breath, fever, chills, nausea, vomiting. Patient has several complaints today about feeling unprepared and not receiving information from the office. Would like a referral to an office that has a Midwife, however, does not want to see [facility]. Education materials provided today. Reports she received her prenatal packet, however she did not read it and threw it away. States she did not understand that she would need this information in the future. Feels uneducated on medications safe in pregnancy, available pediatricians, labor counseling and such. Has hired a , but is not sure what questions to ask her. Has not scheduled birthing classes due to being \"done with appointments. \"    Reports has Established with Deanne Lutz, however she was being referred to a group for counseling and has not heard from them. MQS 24 today. Denies SI/HI. Reports she feels it is related to anxieties and depression related to feeling unprepared for pregnancy. Review of Systems: The following ROS was otherwise negative, except as noted in the HPI: constitutional, HEENT, respiratory, cardiovascular, gastrointestinal, genitourinary, skin, musculoskeletal, neurological, psych    Objective:  /60   Pulse 98   Wt 169 lb (76.7 kg)   LMP 05/07/2021   BMI 29.01 kg/m²     Physical Exam  Vitals reviewed. Constitutional:       General: She is not in acute distress. Appearance: She is well-developed. HENT:      Head: Normocephalic and atraumatic. Eyes:      Conjunctiva/sclera: Conjunctivae normal.   Cardiovascular:      Rate and Rhythm: Normal rate.    Pulmonary:      Effort: Pulmonary effort is normal. No respiratory distress. Abdominal:      General: There is no distension. Palpations: Abdomen is soft. Tenderness: There is no abdominal tenderness. There is no guarding or rebound. Musculoskeletal:         General: No swelling. Skin:     General: Skin is warm and dry. Neurological:      Mental Status: She is alert and oriented to person, place, and time. Psychiatric:         Mood and Affect: Mood normal.         Behavior: Behavior normal.         Thought Content: Thought content normal.      Comments: Quiet, makes poor eye contact         FHR: 158 bpm by doppler    Assessment/Plan:   Chad Weeks is a 27 y.o.  at [address] [facility] who presents for routine OB visit    1. Prenatal care in third trimester     - Patient is overall doing well - see below regarding anxiety     - Fetal wellbeing reassuring by FH and FHR today     - Maternal wellness questionnaire reviewed - score 24     - PNL: A+/ab (-), RI, HepBnR,HepCnR, HIVnR, RPRnR, Varicella Immune, Hgb 11.7, Plt 270, UDS neg, UCx neg, GCCT neg      - Aneuploidy screen: MQS negative     - AFP: Negative     - Anatomy: male, sub-optimal RVOT / diaphragm. Post Placenta. CL 5.07cm     - Repeat images EFW 67 %tile, SANJAY wnl     - Tdap: Given 11/15/2021      - 28 wk labs:  / Hgb 10.8 /        - GBS: Plan at 36 weeks     - Education materials provided for her to establish with alternative practice if she desires, counseled the only midwives that deliver at [facility] are with [facility]. Materials regarding medications in pregnancy as well as Pediatricians. Number to schedule child birth classes provided. Reviewed the role of a  and counseled on some questions to ask her     - Labor, decreased FM, VB, LOF precautions reviewed     -  Will follow-up in 2 weeks for DANIEL visit      2.  Anxiety during pregnancy     - Maternal wellness questionnaire reviewed - score 24 - Denies SI/HI     - Has established with Gardenia Longoria and has been referred to a group, however has not heard back from them as of yet     - Message sent to Gardenia Longoria today to follow-up on this     - Continued anxieties related to health, pregnancy and feelings of unpreparedness     - Extensive discussion reviewed with patient counseling on the physiology and expectations of pregnancy, medications and foods safe in pregnancy. Reviewed use of a . Patient requests transfer to practice that has a Midwife, however does not want to do the research to find her own. Information provided, reviewed will not provide referral, she needs to contact their office and will send records to location of her choice, reviewed few places will accept a 3rd trimester pregnancy transfer of care     - Reviewed child-birth classes and labor expectations     - Encouraged that her pregnancy course is going well and will continue to follow     - All questions were answered to the patient's satisfaction     - Return precautions reviewed     3. Depression affecting pregnancy     - See above    4. Back pain in pregnancy     - Continue Tylenol, Chiropractor, Maternity support belt if desires    5. Nausea and vomiting in pregnancy     - Currently doing well    6. 32 weeks gestation of pregnancy    Approximately 45 minutes spent in room counseling patient on condition and coordination of care with over 50% in direct face to face counseling.        Vanda Holland, DO

## 2021-12-29 PROBLEM — O99.891 BACK PAIN IN PREGNANCY: Status: ACTIVE | Noted: 2021-12-29

## 2021-12-29 PROBLEM — O21.9 NAUSEA AND VOMITING IN PREGNANCY: Status: ACTIVE | Noted: 2021-12-29

## 2021-12-29 PROBLEM — M54.9 BACK PAIN IN PREGNANCY: Status: ACTIVE | Noted: 2021-12-29

## 2022-01-03 ENCOUNTER — ROUTINE PRENATAL (OUTPATIENT)
Dept: OBGYN CLINIC | Age: 32
End: 2022-01-03

## 2022-01-03 VITALS
DIASTOLIC BLOOD PRESSURE: 74 MMHG | SYSTOLIC BLOOD PRESSURE: 120 MMHG | HEART RATE: 111 BPM | BODY MASS INDEX: 29.8 KG/M2 | WEIGHT: 173.6 LBS

## 2022-01-03 DIAGNOSIS — O99.891 BACK PAIN IN PREGNANCY: ICD-10-CM

## 2022-01-03 DIAGNOSIS — R51.9 PREGNANCY HEADACHE IN THIRD TRIMESTER: ICD-10-CM

## 2022-01-03 DIAGNOSIS — O99.891 BACK PAIN AFFECTING PREGNANCY IN THIRD TRIMESTER: ICD-10-CM

## 2022-01-03 DIAGNOSIS — F32.A DEPRESSION AFFECTING PREGNANCY: ICD-10-CM

## 2022-01-03 DIAGNOSIS — F41.8 ANXIETY ABOUT HEALTH: ICD-10-CM

## 2022-01-03 DIAGNOSIS — M54.9 BACK PAIN IN PREGNANCY: ICD-10-CM

## 2022-01-03 DIAGNOSIS — O99.340 DEPRESSION AFFECTING PREGNANCY: ICD-10-CM

## 2022-01-03 DIAGNOSIS — O21.9 NAUSEA AND VOMITING IN PREGNANCY: ICD-10-CM

## 2022-01-03 DIAGNOSIS — F41.9 ANXIETY DURING PREGNANCY: ICD-10-CM

## 2022-01-03 DIAGNOSIS — O26.893 PREGNANCY HEADACHE IN THIRD TRIMESTER: ICD-10-CM

## 2022-01-03 DIAGNOSIS — Z34.93 PRENATAL CARE IN THIRD TRIMESTER: Primary | ICD-10-CM

## 2022-01-03 DIAGNOSIS — M54.9 BACK PAIN AFFECTING PREGNANCY IN THIRD TRIMESTER: ICD-10-CM

## 2022-01-03 DIAGNOSIS — N94.9 ROUND LIGAMENT PAIN: ICD-10-CM

## 2022-01-03 DIAGNOSIS — O99.340 ANXIETY DURING PREGNANCY: ICD-10-CM

## 2022-01-03 PROCEDURE — 0502F SUBSEQUENT PRENATAL CARE: CPT | Performed by: OBSTETRICS & GYNECOLOGY

## 2022-01-06 ENCOUNTER — TELEPHONE (OUTPATIENT)
Dept: OBGYN CLINIC | Age: 32
End: 2022-01-06

## 2022-01-06 NOTE — TELEPHONE ENCOUNTER
Patient calling d/t she is feeling stressed out all morning and is now shaking d/t stress. Patient did eat cereal for breakfast, and only a few bites of her lunch. She has pizza with meat and cheese on it. Encouraged patient to eat the whole slice of pizza so she takes in some protein. She will hydrate as well. She is going to leave work today d/t stress and will need a note for work. If Dr. Aaron Rincon approves please fax work excuse to Attn: Liya Rincon at [phone]. Routing to Dr. Aaron Rincon.

## 2022-01-06 NOTE — TELEPHONE ENCOUNTER
I am okay for note.    Has she established with [facility] or any other counselors yet -- we have discussed this at several appointments  [facility]

## 2022-01-07 NOTE — TELEPHONE ENCOUNTER
Patient aware and she did see Rosa Elena Mujica on 12/6/2021. Letter faxed per patient's request.    Shonda Menjivar to Dr. Noe Coates as Paola Tom.

## 2022-01-09 NOTE — PROGRESS NOTES
27 y.o.  at 34w3d EGA Estimated Date of Delivery: 22 here for DANIEL:     Pt seen and examined. Patient states she feels well today but was less communicative than usual.  I asked her several times how her moods were and she verbalizes they are fine without concerns. She is here with her partner today she denies VB, LOF, CTX. Endorses (+) FM. Denies fevers / chills / chest pain / shortness of breath. Denies HA, changes with vision, RUQ pain, edema. MWQ reviewed. Patient again declined leaving a urine sample. Objective:  /74   Pulse 111   Wt 173 lb 9.6 oz (78.7 kg)   LMP 2021   BMI 29.80 kg/m²   Gen: AO, NAD  Abd: Soft, NT, gravid   Ext: Mild LE edema  OMM: Increased lumbar lordosis    Assessment/Plan:   Diagnosis Orders   1. Prenatal care in third trimester     2. Anxiety during pregnancy     3. Depression affecting pregnancy     4. Back pain in pregnancy     5. Nausea and vomiting in pregnancy     6. Anxiety about health     7. Back pain affecting pregnancy in third trimester     8. Round ligament pain     9. Pregnancy headache in third trimester         Prenatal Care, first pregnancy   · Reassuring fetal / maternal status today. · Aneuploidy / Carrier Screen: Mat Quad Negative    · AFP: Neg  · PNL: A+/ab (-), RI, HepBnR,HepCnR, HIVnR, RPRnR, Varicella Immune, Hgb 11.7, Plt 270, UDS neg, UCx neg, GCCT neg    · Anatomy: male, sub-optimal RVOT / diaphragm. Post Placenta. CL 5.07cm. · Rpt: completed anatomy EFW 67%ile. Normal fluid.    · 28 wk labs:  / Hgb 10.8 /    · Tdap: given 11/15/21     · GBS: at next visit     Anxiety / depression in pregnancy   · Recommend counseling, she is supposed to be following with [facility] -- has been given her office information several times   · Declines medications again today   · Will keep in close contact with patient     Back pain  · Recommend chiropractor as needed      Nausea / Vomiting (IMPROVING)     Follow Up  Return OB precautions

## 2022-01-11 ENCOUNTER — ROUTINE PRENATAL (OUTPATIENT)
Dept: OBGYN CLINIC | Age: 32
End: 2022-01-11
Payer: COMMERCIAL

## 2022-01-11 ENCOUNTER — HOSPITAL ENCOUNTER (OUTPATIENT)
Age: 32
Discharge: HOME | End: 2022-01-11
Attending: OBSTETRICS & GYNECOLOGY | Admitting: OBSTETRICS & GYNECOLOGY
Payer: COMMERCIAL

## 2022-01-11 VITALS
SYSTOLIC BLOOD PRESSURE: 122 MMHG | DIASTOLIC BLOOD PRESSURE: 72 MMHG | WEIGHT: 173.2 LBS | BODY MASS INDEX: 29.73 KG/M2 | HEART RATE: 124 BPM

## 2022-01-11 VITALS
SYSTOLIC BLOOD PRESSURE: 113 MMHG | RESPIRATION RATE: 16 BRPM | TEMPERATURE: 98 F | HEART RATE: 111 BPM | DIASTOLIC BLOOD PRESSURE: 59 MMHG

## 2022-01-11 DIAGNOSIS — F41.8 ANXIETY ABOUT HEALTH: ICD-10-CM

## 2022-01-11 DIAGNOSIS — M54.9 BACK PAIN AFFECTING PREGNANCY IN THIRD TRIMESTER: ICD-10-CM

## 2022-01-11 DIAGNOSIS — O99.891 BACK PAIN AFFECTING PREGNANCY IN THIRD TRIMESTER: ICD-10-CM

## 2022-01-11 DIAGNOSIS — O21.9 NAUSEA AND VOMITING IN PREGNANCY: ICD-10-CM

## 2022-01-11 DIAGNOSIS — N94.9 ROUND LIGAMENT PAIN: ICD-10-CM

## 2022-01-11 DIAGNOSIS — Z34.93 PRENATAL CARE IN THIRD TRIMESTER: Primary | ICD-10-CM

## 2022-01-11 DIAGNOSIS — O99.340 ANXIETY DURING PREGNANCY: ICD-10-CM

## 2022-01-11 DIAGNOSIS — M54.9 BACK PAIN IN PREGNANCY: ICD-10-CM

## 2022-01-11 DIAGNOSIS — F32.A DEPRESSION AFFECTING PREGNANCY: ICD-10-CM

## 2022-01-11 DIAGNOSIS — F41.9 ANXIETY DURING PREGNANCY: ICD-10-CM

## 2022-01-11 DIAGNOSIS — O99.340 DEPRESSION AFFECTING PREGNANCY: ICD-10-CM

## 2022-01-11 DIAGNOSIS — O99.891 BACK PAIN IN PREGNANCY: ICD-10-CM

## 2022-01-11 PROCEDURE — 59025 FETAL NON-STRESS TEST: CPT | Performed by: OBSTETRICS & GYNECOLOGY

## 2022-01-11 PROCEDURE — 99234 HOSP IP/OBS SM DT SF/LOW 45: CPT | Performed by: OBSTETRICS & GYNECOLOGY

## 2022-01-11 PROCEDURE — 99211 OFF/OP EST MAY X REQ PHY/QHP: CPT

## 2022-01-11 PROCEDURE — 0502F SUBSEQUENT PRENATAL CARE: CPT | Performed by: OBSTETRICS & GYNECOLOGY

## 2022-01-11 NOTE — H&P
Obstetrics Triage History and Physical    CC:   Chief Complaint   Patient presents with    Non-stress Test       HPI: Ronaldo James is a 27 y.o.  at 35w4d who presents from office for extended monitoring due to non-reassuring NST in office. Noted to have a single late deceration followed by moderate variability and accelerations, sent for hour of monitoring per primary OB. No VB, LOF. +FM. Occasional contractions. Otherwise no complaints at this time. Review of Systems: The following ROS was otherwise negative, except as noted in the HPI: constitutional, HEENT, respiratory, cardiovascular, gastrointestinal, genitourinary, skin, musculoskeletal, neurological, psych. OBGYN Provider : Yusra    Obstetrical History:  OB History    Para Term  AB Living   3 0 0 0 2 0   SAB IAB Ectopic Molar Multiple Live Births   2 0 0 0 0 0      # Outcome Date GA Lbr Butch/2nd Weight Sex Delivery Anes PTL Lv   3 Current            2 2021           1 2017                Past Medical History:   Past Medical History:   Diagnosis Date    Asthma     asthmatic bronchitis treatment. not dx asthma    Autism     Depression affecting pregnancy 2021    Generalized headaches        Medications:  No current facility-administered medications on file prior to encounter.      Current Outpatient Medications on File Prior to Encounter   Medication Sig Dispense Refill    hydrOXYzine (VISTARIL) 25 MG capsule Take 1 capsule by mouth 3 times daily as needed for Itching (sleep) 30 capsule 0    famotidine (PEPCID) 20 MG tablet Take 1 tablet by mouth 2 times daily as needed (heartburn) 60 tablet 0    Ferrous Sulfate (IRON PO) Take by mouth      Prenatal Vit-Fe Fum-FA-Omega (PRENATAL MULTI +DHA) 27-0.8-228 MG CAPS TAKE 1 CAPSULE BY MOUTH EVERY DAY 30 capsule 8    montelukast (SINGULAIR) 10 MG tablet TAKE 1 TABLET BY MOUTH EVERY DAY AT NIGHT 90 tablet 1    albuterol sulfate HFA (PROVENTIL HFA) 108 (90 Base) MCG/ACT inhaler Inhale 2 puffs into the lungs every 4 hours as needed for Wheezing 1 Inhaler 0       Allergies:  Pertussis vaccines    Surgical History:  Past Surgical History:   Procedure Laterality Date    UPPER GASTROINTESTINAL ENDOSCOPY  04/13/2018    Esophagitis    WISDOM TOOTH EXTRACTION         Family History:  Family History   Problem Relation Age of Onset    Diabetes Mother     Diabetes Father     Heart Disease Maternal Grandmother     Cancer Maternal Grandfather         colon cancer    Heart Disease Maternal Grandfather        Social History:  Social History     Substance and Sexual Activity   Alcohol Use Not Currently    Alcohol/week: 0.0 standard drinks    Comment: occasionally     Social History     Substance and Sexual Activity   Drug Use No     Social History     Tobacco Use   Smoking Status Never Smoker   Smokeless Tobacco Never Used       Physical Exam:  BP (!) 113/59   Pulse 111   Temp 98 °F (36.7 °C) (Oral)   Resp 16   LMP 05/07/2021   Physical Exam  Constitutional:       Appearance: Normal appearance. HENT:      Head: Normocephalic. Cardiovascular:      Rate and Rhythm: Normal rate. Pulmonary:      Effort: Pulmonary effort is normal. No respiratory distress. Abdominal:      Palpations: Abdomen is soft. Tenderness: There is no abdominal tenderness. There is no guarding or rebound. Skin:     General: Skin is warm and dry. Neurological:      General: No focal deficit present. Mental Status: She is alert. Psychiatric:         Mood and Affect: Mood normal.     Cervix: deferred at this time    Fetal Heart Monitor Interpretation:   FHT: 135 bpm, moderate variability, + accels, no decels  Leeper: irregular    Labs:  No visits with results within 1 Day(s) from this visit.    Latest known visit with results is:   Telemedicine on 12/15/2021   Component Date Value    Rapid Influenza A Ag 12/15/2021 Negative     Rapid Influenza B Ag 12/15/2021 Negative Assessment/Plan:   27 y.o.  at 35w4d here for extended fetal monitoring    1.  FWB - reassuring  - reactive, category 1 tracing  - no concerns/complaints, +FM  - reviewed kick counts, return precautions    Dispo: RTC as scheduled on 22  Perry Blake MD

## 2022-01-11 NOTE — PROGRESS NOTES
27 y.o.  at 35w4d EGA Estimated Date of Delivery: 22 here for DANIEL:     Pt seen and examined. Patient states she feels well today and asked lots of questions about labor and delivery today. Overall seems mood is improving. She denies VB, LOF, CTX. Endorses (+) FM. Denies fevers / chills / chest pain / shortness of breath. Denies HA, changes with vision, RUQ pain, edema. MWQ reviewed. Objective:  /72   Pulse 124   Wt 173 lb 3.2 oz (78.6 kg)   LMP 2021   BMI 29.73 kg/m²   Gen: AO, NAD  Abd: Soft, NT, gravid   Ext: Mild LE edema  OMM: Increased lumbar lordosis    Assessment/Plan:   Diagnosis Orders   1. Prenatal care in third trimester  Culture, Strep B Screen, Vaginal/Rectal   2. Fetal tachycardia before the onset of labor  CA FETAL NON-STRESS TEST   3. Anxiety during pregnancy     4. Depression affecting pregnancy     5. Back pain in pregnancy     6. Nausea and vomiting in pregnancy     7. Anxiety about health     8. Back pain affecting pregnancy in third trimester     9. Round ligament pain         Prenatal Care, first pregnancy   · Reassuring fetal / maternal status today. · Aneuploidy / Carrier Screen: Mat Quad Negative    · AFP: Neg  · PNL: A+/ab (-), RI, HepBnR,HepCnR, HIVnR, RPRnR, Varicella Immune, Hgb 11.7, Plt 270, UDS neg, UCx neg, GCCT neg    · Anatomy: male, sub-optimal RVOT / diaphragm. Post Placenta. CL 5.07cm. · Rpt: completed anatomy EFW 67%ile. Normal fluid. · 28 wk labs:  / Hgb 10.8 /    · Tdap: given 11/15/21     · GBS:  Pending    Anxiety / depression in pregnancy   · Recommend counseling, she is supposed to be following with [facility] -- has been given her office information several times   · Declines medications again today   · Will keep in close contact with patient   · Has difficulty keeping appointments due to work schedule.     Back pain  · Recommend chiropractor as needed      Nausea / Vomiting (IMPROVING)     Fetal tachycardia  · Tachycardia noted on Dopplers  · NST with single late deceleration and wondering baseline--to L&D for extended monitoring    Follow Up  Return OB precautions reviewed   Return in about 1 week (around 1/18/2022) for Return OB visit. Approximately 45 minutes spent in room counseling patient on condition and coordination of care with over 50% in direct face to face counseling.      Roshan Munoz, DO

## 2022-01-13 RX ORDER — FAMOTIDINE 20 MG/1
20 TABLET, FILM COATED ORAL 2 TIMES DAILY PRN
Qty: 60 TABLET | Refills: 0 | Status: SHIPPED | OUTPATIENT
Start: 2022-01-13 | End: 2022-05-02 | Stop reason: ALTCHOICE

## 2022-01-14 LAB — GROUP B STREP CULTURE: NORMAL

## 2022-01-17 ENCOUNTER — ROUTINE PRENATAL (OUTPATIENT)
Dept: OBGYN CLINIC | Age: 32
End: 2022-01-17

## 2022-01-17 VITALS
DIASTOLIC BLOOD PRESSURE: 64 MMHG | HEART RATE: 94 BPM | SYSTOLIC BLOOD PRESSURE: 108 MMHG | WEIGHT: 177 LBS | BODY MASS INDEX: 30.38 KG/M2

## 2022-01-17 DIAGNOSIS — F41.8 ANXIETY ABOUT HEALTH: ICD-10-CM

## 2022-01-17 DIAGNOSIS — F32.A DEPRESSION AFFECTING PREGNANCY: ICD-10-CM

## 2022-01-17 DIAGNOSIS — O99.891 BACK PAIN IN PREGNANCY: ICD-10-CM

## 2022-01-17 DIAGNOSIS — Z34.93 PRENATAL CARE IN THIRD TRIMESTER: Primary | ICD-10-CM

## 2022-01-17 DIAGNOSIS — O21.9 NAUSEA AND VOMITING IN PREGNANCY: ICD-10-CM

## 2022-01-17 DIAGNOSIS — M54.9 BACK PAIN IN PREGNANCY: ICD-10-CM

## 2022-01-17 DIAGNOSIS — O99.340 ANXIETY DURING PREGNANCY: ICD-10-CM

## 2022-01-17 DIAGNOSIS — F41.9 ANXIETY DURING PREGNANCY: ICD-10-CM

## 2022-01-17 DIAGNOSIS — O99.340 DEPRESSION AFFECTING PREGNANCY: ICD-10-CM

## 2022-01-17 PROCEDURE — 0502F SUBSEQUENT PRENATAL CARE: CPT | Performed by: OBSTETRICS & GYNECOLOGY

## 2022-01-17 NOTE — PROGRESS NOTES
27 y.o.  at 36w3d EGA Estimated Date of Delivery: 22 here for DANIEL:     Pt seen and examined. Patient states she feels well today and asked lots of questions about labor and delivery today. Overall seems mood is improving. Had long discussion of labor and delivery expectations. Will contact hospital for classes and recommend . Will contact [facility] for class availability ([email or web address]). Declined vaginal exam today. She denies VB, LOF, CTX. Endorses (+) FM. Denies fevers / chills / chest pain / shortness of breath. Denies HA, changes with vision, RUQ pain, edema. MWQ reviewed. Objective:  /64   Pulse 94   Wt 177 lb (80.3 kg)   LMP 2021   BMI 30.38 kg/m²   Gen: AO, NAD  Abd: Soft, NT, gravid   Ext: Mild LE edema  OMM: Increased lumbar lordosis    Assessment/Plan:   Diagnosis Orders   1. Prenatal care in third trimester     2. Fetal tachycardia before the onset of labor     3. Anxiety during pregnancy     4. Depression affecting pregnancy     5. Back pain in pregnancy     6. Nausea and vomiting in pregnancy     7. Anxiety about health         Prenatal Care, first pregnancy   · Reassuring fetal / maternal status today. · Aneuploidy / Carrier Screen: Mat Quad Negative    · AFP: Neg  · PNL: A+/ab (-), RI, HepBnR,HepCnR, HIVnR, RPRnR, Varicella Immune, Hgb 11.7, Plt 270, UDS neg, UCx neg, GCCT neg    · Anatomy: male, sub-optimal RVOT / diaphragm. Post Placenta. CL 5.07cm. · Rpt: completed anatomy EFW 67%ile. Normal fluid. · 28 wk labs:  / Hgb 10.8 /    · Tdap: given 11/15/21     · GBS:  negative     Anxiety / depression in pregnancy   · Recommend counseling, she is supposed to be following with [facility] -- has been given her office information several times   · Declines medications again today   · Will keep in close contact with patient   · Has difficulty keeping appointments due to work schedule.     Back pain  · Recommend chiropractor as needed Nausea / Vomiting (IMPROVING)       Follow Up  Return OB precautions reviewed   Return in about 1 week (around 1/24/2022) for Return OB visit. Approximately 45 minutes spent in room counseling patient on condition and coordination of care with over 50% in direct face to face counseling.      Tara Severance, DO

## 2022-01-17 NOTE — PROGRESS NOTES
Temp: 97.2  Maternal emotional well being screening form completed and reviewed with patient. Declined MWQ again. Declines Urine screen. Patient given referral to [address] [place] [address] ([phone]):  No

## 2022-01-24 ENCOUNTER — ROUTINE PRENATAL (OUTPATIENT)
Dept: OBGYN CLINIC | Age: 32
End: 2022-01-24

## 2022-01-24 VITALS
BODY MASS INDEX: 29.42 KG/M2 | DIASTOLIC BLOOD PRESSURE: 70 MMHG | SYSTOLIC BLOOD PRESSURE: 110 MMHG | WEIGHT: 171.4 LBS | HEART RATE: 120 BPM

## 2022-01-24 DIAGNOSIS — O99.340 ANXIETY DURING PREGNANCY: ICD-10-CM

## 2022-01-24 DIAGNOSIS — N94.9 ROUND LIGAMENT PAIN: ICD-10-CM

## 2022-01-24 DIAGNOSIS — F41.9 ANXIETY DURING PREGNANCY: ICD-10-CM

## 2022-01-24 DIAGNOSIS — O99.340 DEPRESSION AFFECTING PREGNANCY: ICD-10-CM

## 2022-01-24 DIAGNOSIS — F41.8 ANXIETY ABOUT HEALTH: ICD-10-CM

## 2022-01-24 DIAGNOSIS — O99.891 BACK PAIN IN PREGNANCY: ICD-10-CM

## 2022-01-24 DIAGNOSIS — M54.9 BACK PAIN IN PREGNANCY: ICD-10-CM

## 2022-01-24 DIAGNOSIS — Z34.93 PRENATAL CARE IN THIRD TRIMESTER: Primary | ICD-10-CM

## 2022-01-24 DIAGNOSIS — F32.A DEPRESSION AFFECTING PREGNANCY: ICD-10-CM

## 2022-01-24 DIAGNOSIS — O21.9 NAUSEA AND VOMITING IN PREGNANCY: ICD-10-CM

## 2022-01-24 PROCEDURE — 0502F SUBSEQUENT PRENATAL CARE: CPT | Performed by: OBSTETRICS & GYNECOLOGY

## 2022-01-24 NOTE — PROGRESS NOTES
27 y.o.  at 37w3d EGA Estimated Date of Delivery: 22 here for DANIEL:     Pt seen and examined. Concern for COVID -- encouraged pt to get tested. Patient states she feels well today and asked lots of questions about labor and delivery today. Overall seems mood is improving. Had long discussion of labor and delivery expectations. Will contact hospital for classes and recommend . Will contact hospital for class availability [email or web address]). Declined vaginal exam today. She denies VB, LOF, CTX. Endorses (+) FM. Denies fevers / chills / chest pain / shortness of breath. Denies HA, changes with vision, RUQ pain, edema. MWQ reviewed. Objective:  /70   Pulse 120   Wt 171 lb 6.4 oz (77.7 kg)   LMP 2021   BMI 29.42 kg/m²   Gen: AO, NAD  Abd: Soft, NT, gravid   Ext: Mild LE edema  OMM: Increased lumbar lordosis    Assessment/Plan:   Diagnosis Orders   1. Prenatal care in third trimester     2. Anxiety during pregnancy     3. Depression affecting pregnancy     4. Back pain in pregnancy     5. Nausea and vomiting in pregnancy     6. Anxiety about health     7. Round ligament pain         Prenatal Care, first pregnancy   · Reassuring fetal / maternal status today. · Aneuploidy / Carrier Screen: Mat Quad Negative    · AFP: Neg  · PNL: A+/ab (-), RI, HepBnR,HepCnR, HIVnR, RPRnR, Varicella Immune, Hgb 11.7, Plt 270, UDS neg, UCx neg, GCCT neg    · Anatomy: male, sub-optimal RVOT / diaphragm. Post Placenta. CL 5.07cm. · Rpt: completed anatomy EFW 67%ile. Normal fluid. · 28 wk labs:  / Hgb 10.8 /    · Tdap: given 11/15/21     · GBS:  negative     Anxiety / depression in pregnancy   · Recommend counseling, she is supposed to be following with [facility] -- has been given her office information several times   · Declines medications again today   · Will keep in close contact with patient   · Has difficulty keeping appointments due to work schedule.     Back pain  · Recommend chiropractor as needed      Nausea / Vomiting (IMPROVING)       Follow Up  Return OB precautions reviewed   Return in about 1 week (around 1/31/2022) for Return OB visit. Approximately 45 minutes spent in room counseling patient on condition and coordination of care with over 50% in direct face to face counseling.      Molly Saravia,

## 2022-01-31 ENCOUNTER — ROUTINE PRENATAL (OUTPATIENT)
Dept: OBGYN CLINIC | Age: 32
End: 2022-01-31

## 2022-01-31 DIAGNOSIS — O21.9 NAUSEA AND VOMITING IN PREGNANCY: ICD-10-CM

## 2022-01-31 DIAGNOSIS — O99.340 ANXIETY DURING PREGNANCY: ICD-10-CM

## 2022-01-31 DIAGNOSIS — O99.340 DEPRESSION AFFECTING PREGNANCY: ICD-10-CM

## 2022-01-31 DIAGNOSIS — F41.9 ANXIETY DURING PREGNANCY: ICD-10-CM

## 2022-01-31 DIAGNOSIS — M54.9 BACK PAIN IN PREGNANCY: ICD-10-CM

## 2022-01-31 DIAGNOSIS — Z34.93 PRENATAL CARE IN THIRD TRIMESTER: Primary | ICD-10-CM

## 2022-01-31 DIAGNOSIS — F32.A DEPRESSION AFFECTING PREGNANCY: ICD-10-CM

## 2022-01-31 DIAGNOSIS — O99.891 BACK PAIN IN PREGNANCY: ICD-10-CM

## 2022-01-31 PROCEDURE — 0502F SUBSEQUENT PRENATAL CARE: CPT | Performed by: OBSTETRICS & GYNECOLOGY

## 2022-01-31 NOTE — PROGRESS NOTES
27 y.o.  at 38w3d EGA Estimated Date of Delivery: 22 here for DANIEL:     Pt seen and examined. Had another long discussion of labor and delivery expectations. Will contact hospital for classes. Has a  --and she also lean on her for support. .    Declined vaginal exam today. She denies VB, LOF, CTX. Endorses (+) FM. Denies fevers / chills / chest pain / shortness of breath. Denies HA, changes with vision, RUQ pain, edema. MWQ reviewed. Objective:  /76   Pulse 90   Wt 174 lb 12.8 oz (79.3 kg)   LMP 2021   BMI 30.00 kg/m²   Gen: AO, NAD  Abd: Soft, NT, gravid   Ext: Mild LE edema  OMM: Increased lumbar lordosis    Assessment/Plan:   Diagnosis Orders   1. Prenatal care in third trimester     2. Anxiety during pregnancy     3. Depression affecting pregnancy     4. Back pain in pregnancy     5. Nausea and vomiting in pregnancy         Prenatal Care, first pregnancy   · Reassuring fetal / maternal status today. · Aneuploidy / Carrier Screen: Mat Quad Negative    · AFP: Neg  · PNL: A+/ab (-), RI, HepBnR,HepCnR, HIVnR, RPRnR, Varicella Immune, Hgb 11.7, Plt 270, UDS neg, UCx neg, GCCT neg    · Anatomy: male, sub-optimal RVOT / diaphragm. Post Placenta. CL 5.07cm. · Rpt: completed anatomy EFW 67%ile. Normal fluid. · 28 wk labs:  / Hgb 10.8 /    · Tdap: given 11/15/21     · GBS:  negative     Anxiety / depression in pregnancy   · Recommend counseling, she is supposed to be following with [facility] -- has been given her office information several times   · Declines medications again today   · Will keep in close contact with patient   · Has difficulty keeping appointments due to work schedule. Back pain  · Recommend chiropractor as needed      Nausea / Vomiting (IMPROVING)       Follow Up  Return OB precautions reviewed   Return in about 1 week (around 2022).     Approximately 45 minutes spent in room counseling patient on condition and coordination of care with over 50% in direct face to face counseling.      Bernie Schwartz, DO

## 2022-02-04 VITALS
BODY MASS INDEX: 30 KG/M2 | WEIGHT: 174.8 LBS | DIASTOLIC BLOOD PRESSURE: 76 MMHG | HEART RATE: 90 BPM | SYSTOLIC BLOOD PRESSURE: 118 MMHG

## 2022-02-07 ENCOUNTER — HOSPITAL ENCOUNTER (INPATIENT)
Age: 32
LOS: 2 days | Discharge: HOME | End: 2022-02-09
Attending: OBSTETRICS & GYNECOLOGY | Admitting: OBSTETRICS & GYNECOLOGY
Payer: COMMERCIAL

## 2022-02-07 PROBLEM — Z37.9 NORMAL LABOR: Status: ACTIVE | Noted: 2022-02-07

## 2022-02-07 LAB
ABO/RH: NORMAL
AMPHETAMINE SCREEN, URINE: NORMAL
ANTIBODY SCREEN: NORMAL
BARBITURATE SCREEN URINE: NORMAL
BASOPHILS ABSOLUTE: 0.1 K/UL (ref 0–0.2)
BASOPHILS RELATIVE PERCENT: 0.3 %
BENZODIAZEPINE SCREEN, URINE: NORMAL
BUPRENORPHINE URINE: NORMAL
CANNABINOID SCREEN URINE: NORMAL
COCAINE METABOLITE SCREEN URINE: NORMAL
EOSINOPHILS ABSOLUTE: 0 K/UL (ref 0–0.6)
EOSINOPHILS RELATIVE PERCENT: 0.3 %
HCT VFR BLD CALC: 35.2 % (ref 36–48)
HEMOGLOBIN: 11.7 G/DL (ref 12–16)
LYMPHOCYTES ABSOLUTE: 2.1 K/UL (ref 1–5.1)
LYMPHOCYTES RELATIVE PERCENT: 11.7 %
Lab: NORMAL
MCH RBC QN AUTO: 26.6 PG (ref 26–34)
MCHC RBC AUTO-ENTMCNC: 33.1 G/DL (ref 31–36)
MCV RBC AUTO: 80.2 FL (ref 80–100)
METHADONE SCREEN, URINE: NORMAL
MONOCYTES ABSOLUTE: 0.5 K/UL (ref 0–1.3)
MONOCYTES RELATIVE PERCENT: 2.9 %
NEUTROPHILS ABSOLUTE: 14.9 K/UL (ref 1.7–7.7)
NEUTROPHILS RELATIVE PERCENT: 84.8 %
OPIATE SCREEN URINE: NORMAL
OXYCODONE URINE: NORMAL
PDW BLD-RTO: 13.9 % (ref 12.4–15.4)
PH UA: 6
PHENCYCLIDINE SCREEN URINE: NORMAL
PLATELET # BLD: 249 K/UL (ref 135–450)
PMV BLD AUTO: 8.5 FL (ref 5–10.5)
PROPOXYPHENE SCREEN: NORMAL
RBC # BLD: 4.39 M/UL (ref 4–5.2)
SARS-COV-2, NAAT: NOT DETECTED
WBC # BLD: 17.5 K/UL (ref 4–11)

## 2022-02-07 PROCEDURE — 1220000000 HC SEMI PRIVATE OB R&B

## 2022-02-07 PROCEDURE — 7200000001 HC VAGINAL DELIVERY

## 2022-02-07 PROCEDURE — 59400 OBSTETRICAL CARE: CPT | Performed by: OBSTETRICS & GYNECOLOGY

## 2022-02-07 PROCEDURE — 0DQR0ZZ REPAIR ANAL SPHINCTER, OPEN APPROACH: ICD-10-PCS | Performed by: OBSTETRICS & GYNECOLOGY

## 2022-02-07 PROCEDURE — 86901 BLOOD TYPING SEROLOGIC RH(D): CPT

## 2022-02-07 PROCEDURE — 36415 COLL VENOUS BLD VENIPUNCTURE: CPT

## 2022-02-07 PROCEDURE — 86592 SYPHILIS TEST NON-TREP QUAL: CPT

## 2022-02-07 PROCEDURE — 80307 DRUG TEST PRSMV CHEM ANLYZR: CPT

## 2022-02-07 PROCEDURE — 6360000002 HC RX W HCPCS: Performed by: OBSTETRICS & GYNECOLOGY

## 2022-02-07 PROCEDURE — 10907ZU DRAINAGE OF AMNIOTIC FLUID, DIAGNOSTIC FROM PRODUCTS OF CONCEPTION, VIA NATURAL OR ARTIFICIAL OPENING: ICD-10-PCS | Performed by: OBSTETRICS & GYNECOLOGY

## 2022-02-07 PROCEDURE — 2500000003 HC RX 250 WO HCPCS

## 2022-02-07 PROCEDURE — 6360000002 HC RX W HCPCS

## 2022-02-07 PROCEDURE — 86900 BLOOD TYPING SEROLOGIC ABO: CPT

## 2022-02-07 PROCEDURE — 85025 COMPLETE CBC W/AUTO DIFF WBC: CPT

## 2022-02-07 PROCEDURE — 86850 RBC ANTIBODY SCREEN: CPT

## 2022-02-07 PROCEDURE — 3E033VJ INTRODUCTION OF OTHER HORMONE INTO PERIPHERAL VEIN, PERCUTANEOUS APPROACH: ICD-10-PCS | Performed by: OBSTETRICS & GYNECOLOGY

## 2022-02-07 PROCEDURE — 6370000000 HC RX 637 (ALT 250 FOR IP): Performed by: OBSTETRICS & GYNECOLOGY

## 2022-02-07 PROCEDURE — 87635 SARS-COV-2 COVID-19 AMP PRB: CPT

## 2022-02-07 RX ORDER — SODIUM CHLORIDE 0.9 % (FLUSH) 0.9 %
5-40 SYRINGE (ML) INJECTION PRN
Status: DISCONTINUED | OUTPATIENT
Start: 2022-02-07 | End: 2022-02-07

## 2022-02-07 RX ORDER — HYDROCODONE BITARTRATE AND ACETAMINOPHEN 5; 325 MG/1; MG/1
2 TABLET ORAL EVERY 6 HOURS PRN
Status: DISCONTINUED | OUTPATIENT
Start: 2022-02-07 | End: 2022-02-09 | Stop reason: HOSPADM

## 2022-02-07 RX ORDER — FAMOTIDINE 20 MG/1
20 TABLET, FILM COATED ORAL 2 TIMES DAILY PRN
Status: DISCONTINUED | OUTPATIENT
Start: 2022-02-07 | End: 2022-02-09 | Stop reason: HOSPADM

## 2022-02-07 RX ORDER — SODIUM CHLORIDE 0.9 % (FLUSH) 0.9 %
5-40 SYRINGE (ML) INJECTION PRN
Status: DISCONTINUED | OUTPATIENT
Start: 2022-02-07 | End: 2022-02-09 | Stop reason: HOSPADM

## 2022-02-07 RX ORDER — LANOLIN 100 %
OINTMENT (GRAM) TOPICAL PRN
Status: DISCONTINUED | OUTPATIENT
Start: 2022-02-07 | End: 2022-02-09 | Stop reason: HOSPADM

## 2022-02-07 RX ORDER — SODIUM CHLORIDE 9 MG/ML
25 INJECTION, SOLUTION INTRAVENOUS PRN
Status: DISCONTINUED | OUTPATIENT
Start: 2022-02-07 | End: 2022-02-09 | Stop reason: HOSPADM

## 2022-02-07 RX ORDER — SODIUM CHLORIDE, SODIUM LACTATE, POTASSIUM CHLORIDE, AND CALCIUM CHLORIDE .6; .31; .03; .02 G/100ML; G/100ML; G/100ML; G/100ML
1000 INJECTION, SOLUTION INTRAVENOUS PRN
Status: DISCONTINUED | OUTPATIENT
Start: 2022-02-07 | End: 2022-02-07

## 2022-02-07 RX ORDER — MONTELUKAST SODIUM 10 MG/1
10 TABLET ORAL NIGHTLY
Status: DISCONTINUED | OUTPATIENT
Start: 2022-02-07 | End: 2022-02-09 | Stop reason: HOSPADM

## 2022-02-07 RX ORDER — SODIUM CHLORIDE, SODIUM LACTATE, POTASSIUM CHLORIDE, AND CALCIUM CHLORIDE .6; .31; .03; .02 G/100ML; G/100ML; G/100ML; G/100ML
500 INJECTION, SOLUTION INTRAVENOUS PRN
Status: DISCONTINUED | OUTPATIENT
Start: 2022-02-07 | End: 2022-02-07

## 2022-02-07 RX ORDER — ONDANSETRON 2 MG/ML
4 INJECTION INTRAMUSCULAR; INTRAVENOUS EVERY 6 HOURS PRN
Status: DISCONTINUED | OUTPATIENT
Start: 2022-02-07 | End: 2022-02-07

## 2022-02-07 RX ORDER — SIMETHICONE 80 MG
80 TABLET,CHEWABLE ORAL EVERY 6 HOURS PRN
Status: DISCONTINUED | OUTPATIENT
Start: 2022-02-07 | End: 2022-02-09 | Stop reason: HOSPADM

## 2022-02-07 RX ORDER — DOCUSATE SODIUM 100 MG/1
100 CAPSULE, LIQUID FILLED ORAL 2 TIMES DAILY
Status: DISCONTINUED | OUTPATIENT
Start: 2022-02-07 | End: 2022-02-09 | Stop reason: HOSPADM

## 2022-02-07 RX ORDER — HYDROCODONE BITARTRATE AND ACETAMINOPHEN 5; 325 MG/1; MG/1
1 TABLET ORAL EVERY 6 HOURS PRN
Status: DISCONTINUED | OUTPATIENT
Start: 2022-02-07 | End: 2022-02-09 | Stop reason: HOSPADM

## 2022-02-07 RX ORDER — FERROUS SULFATE 325(65) MG
325 TABLET ORAL
Status: DISCONTINUED | OUTPATIENT
Start: 2022-02-08 | End: 2022-02-09 | Stop reason: HOSPADM

## 2022-02-07 RX ORDER — ALBUTEROL SULFATE 90 UG/1
2 AEROSOL, METERED RESPIRATORY (INHALATION) EVERY 4 HOURS PRN
Status: DISCONTINUED | OUTPATIENT
Start: 2022-02-07 | End: 2022-02-09 | Stop reason: HOSPADM

## 2022-02-07 RX ORDER — SODIUM CHLORIDE 0.9 % (FLUSH) 0.9 %
5-40 SYRINGE (ML) INJECTION EVERY 12 HOURS SCHEDULED
Status: DISCONTINUED | OUTPATIENT
Start: 2022-02-07 | End: 2022-02-07

## 2022-02-07 RX ORDER — ONDANSETRON 2 MG/ML
4 INJECTION INTRAMUSCULAR; INTRAVENOUS EVERY 6 HOURS PRN
Status: DISCONTINUED | OUTPATIENT
Start: 2022-02-07 | End: 2022-02-09 | Stop reason: HOSPADM

## 2022-02-07 RX ORDER — FENTANYL CITRATE 50 UG/ML
INJECTION, SOLUTION INTRAMUSCULAR; INTRAVENOUS
Status: DISCONTINUED
Start: 2022-02-07 | End: 2022-02-07

## 2022-02-07 RX ORDER — SODIUM CHLORIDE 9 MG/ML
25 INJECTION, SOLUTION INTRAVENOUS PRN
Status: DISCONTINUED | OUTPATIENT
Start: 2022-02-07 | End: 2022-02-07

## 2022-02-07 RX ORDER — IBUPROFEN 800 MG/1
800 TABLET ORAL EVERY 8 HOURS
Status: DISCONTINUED | OUTPATIENT
Start: 2022-02-07 | End: 2022-02-09 | Stop reason: HOSPADM

## 2022-02-07 RX ORDER — ACETAMINOPHEN 325 MG/1
650 TABLET ORAL EVERY 4 HOURS PRN
Status: DISCONTINUED | OUTPATIENT
Start: 2022-02-07 | End: 2022-02-09 | Stop reason: HOSPADM

## 2022-02-07 RX ORDER — SODIUM CHLORIDE, SODIUM LACTATE, POTASSIUM CHLORIDE, CALCIUM CHLORIDE 600; 310; 30; 20 MG/100ML; MG/100ML; MG/100ML; MG/100ML
INJECTION, SOLUTION INTRAVENOUS CONTINUOUS
Status: DISCONTINUED | OUTPATIENT
Start: 2022-02-07 | End: 2022-02-09 | Stop reason: HOSPADM

## 2022-02-07 RX ORDER — SODIUM CHLORIDE 0.9 % (FLUSH) 0.9 %
5-40 SYRINGE (ML) INJECTION EVERY 12 HOURS SCHEDULED
Status: DISCONTINUED | OUTPATIENT
Start: 2022-02-07 | End: 2022-02-09 | Stop reason: HOSPADM

## 2022-02-07 RX ORDER — LIDOCAINE HYDROCHLORIDE 20 MG/ML
INJECTION, SOLUTION INFILTRATION; PERINEURAL
Status: COMPLETED
Start: 2022-02-07 | End: 2022-02-07

## 2022-02-07 RX ORDER — FENTANYL CITRATE 50 UG/ML
50 INJECTION, SOLUTION INTRAMUSCULAR; INTRAVENOUS ONCE
Status: COMPLETED | OUTPATIENT
Start: 2022-02-07 | End: 2022-02-07

## 2022-02-07 RX ORDER — SODIUM CHLORIDE, SODIUM LACTATE, POTASSIUM CHLORIDE, CALCIUM CHLORIDE 600; 310; 30; 20 MG/100ML; MG/100ML; MG/100ML; MG/100ML
INJECTION, SOLUTION INTRAVENOUS CONTINUOUS
Status: DISCONTINUED | OUTPATIENT
Start: 2022-02-07 | End: 2022-02-07

## 2022-02-07 RX ADMIN — DOCUSATE SODIUM 100 MG: 100 CAPSULE, LIQUID FILLED ORAL at 22:14

## 2022-02-07 RX ADMIN — BENZOCAINE AND LEVOMENTHOL: 200; 5 SPRAY TOPICAL at 15:13

## 2022-02-07 RX ADMIN — IBUPROFEN 800 MG: 800 TABLET, FILM COATED ORAL at 23:37

## 2022-02-07 RX ADMIN — MONTELUKAST SODIUM 10 MG: 10 TABLET ORAL at 22:14

## 2022-02-07 RX ADMIN — FENTANYL CITRATE 50 MCG: 50 INJECTION INTRAMUSCULAR; INTRAVENOUS at 11:19

## 2022-02-07 RX ADMIN — LIDOCAINE HYDROCHLORIDE: 20 INJECTION, SOLUTION INFILTRATION; PERINEURAL at 11:30

## 2022-02-07 RX ADMIN — Medication: at 11:30

## 2022-02-07 RX ADMIN — WITCH HAZEL 40 EACH: 500 SOLUTION RECTAL; TOPICAL at 15:13

## 2022-02-07 RX ADMIN — ACETAMINOPHEN 650 MG: 325 TABLET ORAL at 15:13

## 2022-02-07 RX ADMIN — IBUPROFEN 800 MG: 800 TABLET, FILM COATED ORAL at 15:13

## 2022-02-07 RX ADMIN — ACETAMINOPHEN 650 MG: 325 TABLET ORAL at 22:14

## 2022-02-07 ASSESSMENT — PAIN SCALES - GENERAL
PAINLEVEL_OUTOF10: 10
PAINLEVEL_OUTOF10: 10
PAINLEVEL_OUTOF10: 3

## 2022-02-07 NOTE — PROGRESS NOTES
Patient in triage with chief complaint of contractions. Denies LOF, vaginal bleeding at this time and still feels baby move per usual. SVE at 0320 was 4-5/5-/-2. Lizabeth Resendez at 9913 to tell about patient's arrival and SVE. Orders given at this time to admit patient. MD sky with patient being placed on intermittent monitoring upon reactive tracing.

## 2022-02-07 NOTE — PROGRESS NOTES
Order for nitrous oxide verified. Pt agreement reviewed and signed by patient. Pt educated on use of self-administered nitrous oxide with returned demonstration. Nitrous oxide initiated at 0536. Pulse oximeter 97% prior to initiation.

## 2022-02-07 NOTE — PROGRESS NOTES
Called Dr. Kirit Sullivan because patient would like to use nitrous oxide. MD ok with this at this time.

## 2022-02-07 NOTE — PLAN OF CARE
Problem: Anxiety:  Goal: Level of anxiety will decrease  Description: Level of anxiety will decrease  2/7/2022 1257 by Deana Camilo RN  Outcome: Completed  2/7/2022 0507 by Jimmy Pretty RN  Outcome: Ongoing     Problem: Breathing Pattern - Ineffective:  Goal: Able to breathe comfortably  Description: Able to breathe comfortably  2/7/2022 1257 by Deana Camilo RN  Outcome: Completed  2/7/2022 0507 by Jimmy Pretty RN  Outcome: Ongoing     Problem: Fluid Volume - Imbalance:  Goal: Absence of imbalanced fluid volume signs and symptoms  Description: Absence of imbalanced fluid volume signs and symptoms  2/7/2022 1257 by Deana Camilo RN  Outcome: Completed  2/7/2022 0507 by Jimmy Pretty RN  Outcome: Ongoing  Goal: Absence of intrapartum hemorrhage signs and symptoms  Description: Absence of intrapartum hemorrhage signs and symptoms  2/7/2022 1257 by Deana Cmailo RN  Outcome: Completed  2/7/2022 0507 by Jimmy Pretty RN  Outcome: Ongoing     Problem: Infection - Intrapartum Infection:  Goal: Will show no infection signs and symptoms  Description: Will show no infection signs and symptoms  2/7/2022 1257 by Deana Camilo RN  Outcome: Completed  2/7/2022 0507 by Jimmy Pretty RN  Outcome: Ongoing     Problem: Labor Process - Prolonged:  Goal: Labor progression, first stage, within specified pattern  Description: Labor progression, first stage, within specified pattern  2/7/2022 1257 by Deana Camilo RN  Outcome: Completed  2/7/2022 0507 by Jimmy Pretty RN  Outcome: Ongoing  Goal: Labor progession, second stage, within specified pattern  Description: Labor progession, second stage, within specified pattern  2/7/2022 1257 by Deana Camilo RN  Outcome: Completed  2/7/2022 0507 by Jimmy Pretty RN  Outcome: Ongoing  Goal: Uterine contractions within specified parameters  Description: Uterine contractions within specified parameters  2/7/2022 1257 by Juna Carlos Bonilla RN  Outcome: Completed  2022 0507 by Mariah Marie RN  Outcome: Ongoing     Problem:  Screening:  Goal: Ability to make informed decisions regarding treatment has improved  Description: Ability to make informed decisions regarding treatment has improved  2022 1257 by Juan Carlos Bonilla RN  Outcome: Completed  2022 050 by Mariah Marie RN  Outcome: Ongoing     Problem: Pain - Acute:  Goal: Pain level will decrease  Description: Pain level will decrease  2022 1257 by Juan Carlos Bonilla RN  Outcome: Completed  2022 050 by Mariah Marie RN  Outcome: Ongoing  Goal: Able to cope with pain  Description: Able to cope with pain  2022 1257 by Juan Carlos Bonilla RN  Outcome: Completed  2022 050 by Mariah Marie RN  Outcome: Ongoing     Problem: Tissue Perfusion - Uteroplacental, Altered:  Description: [TRUNCATED] For intrapartum patients with recurrent variable decelerations of the fetal heart rate, consider transcervical amnioinfusion. For patients in labor, avoid prophylactic use of continuous maternal oxygen supplementation to prevent nonreassu . ..   Goal: Absence of abnormal fetal heart rate pattern  Description: Absence of abnormal fetal heart rate pattern  2022 1257 by Juan Carlos Bonilla RN  Outcome: Completed  2022 050 by Mariah Marie RN  Outcome: Ongoing     Problem: Urinary Retention:  Goal: Experiences of bladder distention will decrease  Description: Experiences of bladder distention will decrease  2022 1257 by Juan Carlos Bonilla RN  Outcome: Completed  2022 0507 by Mariah Marie RN  Outcome: Ongoing  Goal: Urinary elimination within specified parameters  Description: Urinary elimination within specified parameters  2022 1257 by Juan Carlos Bonilla RN  Outcome: Completed  2022 0507 by Mariah Marie RN  Outcome: Ongoing     Problem: Pain:  Description: Pain management should include both nonpharmacologic and pharmacologic interventions. Goal: Pain level will decrease  Description: Pain level will decrease  2/7/2022 1257 by Hazle Kehr, RN  Outcome: Completed  2/7/2022 0507 by Anahi Calderon RN  Outcome: Ongoing  Goal: Control of acute pain  Description: Control of acute pain  2/7/2022 1257 by Hazle Kehr, RN  Outcome: Completed  2/7/2022 0507 by Anahi Calderon RN  Outcome: Ongoing  Goal: Control of chronic pain  Description: Control of chronic pain  2/7/2022 1257 by Hazle Kehr, RN  Outcome: Completed  2/7/2022 0507 by Anahi Calderon RN  Outcome: Ongoing     Problem: VAGINAL DELIVERY - RECOVERY AND POST PARTUM  Goal: Vital signs are medically acceptable  Outcome: Ongoing  Goal: Patient will remain free of falls  Outcome: Ongoing  Goal: Fundus firm at midline  Outcome: Ongoing  Goal: Moderate rubra without clots, no purulent discharge, no foul smelling lochia  Outcome: Ongoing  Goal: Empties bladder  Outcome: Ongoing  Goal: Verbalizes understanding of normal bowel function resumption  Outcome: Ongoing  Goal: Edema will be absent or minimal  Outcome: Ongoing  Goal: Breasts are soft with nipple integrity intact  Outcome: Ongoing  Goal: Demonstrates appropriate breast feeding techniques  Outcome: Ongoing  Goal: Appropriate behavior observed  Outcome: Ongoing  Goal: Positive Mother-Baby interactions are observed  Outcome: Ongoing  Goal: Perineum intact without discharge or hematoma  Outcome: Ongoing  Goal: Ambulates independently  Outcome: Ongoing     Problem: PAIN  Goal: Patient's pain/discomfort is manageable  Outcome: Ongoing     Problem: KNOWLEDGE DEFICIT  Goal: Patient/S.O. demonstrates understanding of disease process, treatment plan, medications, and discharge instructions.   Outcome: Ongoing

## 2022-02-07 NOTE — L&D DELIVERY SUMMARY NOTE
Department of Obstetrics and Gynecology  Spontaneous Vaginal Delivery Note      Pre-operative Diagnosis:  Term pregnancy, Spontaneous labor, Single fetus and Pregnancy complicated by: anxiety and depression    Post-operative Diagnosis:  Living  infant(s) and Male    Information for the patient's :  Adolph Hernandez [[id number]]                    Infant Wt:   Information for the patient's :  Adolph Hernandez [[id number]]           APGARS:     Information for the patient's :  Adolph Hernandez [[id number]]           Anesthesia:  Nitrous oxide,  20 cc 2% lidocaine infiltrate post delivery     Application and Delivery:    28 y/o  female at 44 week 3 day gestation with [facility] 2022 presented to L&D early AM on 2022 secondary to regular contractions. Patient was admitted after examination revealed cervical change to 4-5 cm. Patient declined intervention for pain control including epidural.  Patient did accept use of nitrous oxide and nitrous was administered. Patient progressed throughout the morning. At 8-9 cm, vaginal bleeding was noted--mild, bright red. Patient was advised to not bear down for concerns of cervical injury. Patient unable to suppress involuntary pushing. Amniotomy was performed for small amount of clear fluid. Patient continued to labor and upon reaching complete dilatation, patient was instructed on active pushing. Patient then pushed effectively for < 15 minutes and delivered a viable male  over an intact perineum. A loose nuchal cord x 1 was reduced on the perineum. Shoulders and body delivered immediately after the head and  was placed on maternal abdomen for suctioning and drying. After delay, cord was clamped and cut. A segment of cord was collected but later discarded due to fetal wellbeing. Cord blood was collected. Placenta delivered subsequently complete intact with 3 vessel cord.   The lower uterine segment was evaluated and found to be clear of all clots and debris. The anterior cervix was noted to abraded and swollen but ultimately hemostatic. The fundus was found to be firm and hemostasis assured. Bilateral sulcus tears and a 3rd degree perineal laceration were identified and repaired in the usual fashion using 2-0 and 3-0 vicryl. Hemostasis was assured. 20 cc 2% lidocaine and IV Fentanyl were utilized with limited effect for pain control. Patient declined further sedation or anesthesia during the repair after pain control was found to be limited. Upon completion of repair, rectum was found to be intact. Sponge, lap and needle counts were correct x 2. Patient tolerated the procedure well and was left to recover in stable condition with . Delivery Summary:  Labor & Delivery Summary  Dilation Complete Date: 22  Dilation Complete Time: 925    Specimen:  Placenta sent to pathology for hold specimen     Intake/Output:     Date 22 - 22 0700(Not Admitted) 22 07 - 22 0700   Shift 0146-4551 [id number] 24 Hour Total [id number] [id number] 24 Hour Total   INTAKE   Shift Total         OUTPUT   Blood    400  400     EBL (mL)    400  400   Shift Total    400  400   NET    -400  -400       Condition:  infant stable and mother stable    Blood Type and Rh: A POS        Rubella Immunity Status:   Immune           Infant Feeding:    breast    Attending Attestation: I performed the procedure.

## 2022-02-07 NOTE — H&P
Department of Obstetrics and Gynecology  Attending Obstetrics History and Physical        CHIEF COMPLAINT:  contractions    HISTORY OF PRESENT ILLNESS:      The patient is a 27 y.o.  3 parity 0020 at 44 weeks 2 days gestation with [facility] 22 who presents for regular contractions. Patient found to be 4-5 cm on admission. Denies loss of fluid and decreased fetal movement. Admits to spotting. Pregnancy is complicated by anxiety, depression, back pain and nausea and vomiting. Patient presents with a chief complaint as above and is being admitted for active phase labor    DATES:    Last Menstrual Period:  2021  Estimated Due Date:  22    PRENATAL CARE:    Provider:  GLORY Contreras. [facility]    Blood Type/Rh:  A positive  Antibody Screen:  negative  Rubella:  immune  RPR:  Non-reactive  Hepatitis B Surface Antigen: non-reactive  HIV:  Non-reactive  Gonorrhea:  negative  Chlamydia:  negative  MSAFP/Multiple Markers:  Date: 21 ; Results:  negative  U/S Structural Survery:  See report  1 hour Glucose Tolerance Test:  123  Group B Strep:  negative      PAST OB HISTORY        Depression:  Yes       Post-partum depression:  No      Diabetes:  No      Gestational Diabetes:  No      Thyroid Disease:  No      Chronic HTN:  No      Gestation HTN:  No      Pre-eclampsia:  No      Seizure disorder:  No      Asthma:  No      Clotting disorder:  No      :  No      Tubal ligation:  No      D & C:  No      Cerclage:  No      LEEP:  No      Myomectomy:  No    OB History    Para Term  AB Living   3       2 0   SAB IAB Ectopic Molar Multiple Live Births   2                # Outcome Date GA Lbr Butch/2nd Weight Sex Delivery Anes PTL Lv   3 Current            2 2021             Past Gynecological History:      Menarche:    Last menstrual period:  Patient's last menstrual period was 2021.   History of uterine fibroids:  No  History of endometriosis: No  Pap History:  Last PAP was normal; February/2020. Sexually transmitted disease history: none    Past Medical History:        Diagnosis Date    Asthma     asthmatic bronchitis treatment. not dx asthma    Autism     Depression affecting pregnancy 12/6/2021    Generalized headaches      Past Surgical History:        Procedure Laterality Date    UPPER GASTROINTESTINAL ENDOSCOPY  04/13/2018    Esophagitis    WISDOM TOOTH EXTRACTION       Social History:    TOBACCO:   reports that she has never smoked. She has never used smokeless tobacco.  ETOH:   reports previous alcohol use. DRUGS:   reports no history of drug use. Family History:       Problem Relation Age of Onset    Diabetes Mother     Diabetes Father     Heart Disease Maternal Grandmother     Cancer Maternal Grandfather         colon cancer    Heart Disease Maternal Grandfather      Medications Prior to Admission:  Medications Prior to Admission: famotidine (PEPCID) 20 MG tablet, TAKE 1 TABLET BY MOUTH 2 TIMES DAILY AS NEEDED (HEARTBURN)  Prenatal Vit-Fe Fum-FA-Omega (PRENATAL MULTI +DHA) 27-0.8-228 MG CAPS, TAKE 1 CAPSULE BY MOUTH EVERY DAY  Ferrous Sulfate (IRON PO), Take by mouth  montelukast (SINGULAIR) 10 MG tablet, TAKE 1 TABLET BY MOUTH EVERY DAY AT NIGHT  albuterol sulfate HFA (PROVENTIL HFA) 108 (90 Base) MCG/ACT inhaler, Inhale 2 puffs into the lungs every 4 hours as needed for Wheezing  Allergies:  Pertussis vaccines    REVIEW OF SYSTEMS:    Patient has a history of depression .   Patient has no symptoms of depression  CONSTITUTIONAL:  negative for  fevers, chills and fatigue  RESPIRATORY:  negative for  dry cough, cough with sputum and dyspnea  CARDIOVASCULAR:  negative for  chest pain, dyspnea  GASTROINTESTINAL:  negative for nausea, vomiting, diarrhea and constipation  GENITOURINARY:  positive for abdominal pain  negative for dysuria and hematuria  INTEGUMENT/BREAST:  negative  MUSCULOSKELETAL:  negative  NEUROLOGICAL:  negative for headaches and visual disturbance  BEHAVIOR/PSYCH:  negative    PHYSICAL EXAM:  Vitals:    22 0828   BP: 132/66   Pulse: 104   Resp: 18   Temp:    SpO2:      General appearance:  awake, alert, cooperative, no apparent distress, and appears stated age  Neurologic:  Mental Status Exam:  Level of Alertness:   awake  Orientation:   person, place, time  Memory:   normal  Fund of Knowledge:  normal  Attention/Concentration:  normal  Language:  normal  Lungs:  No increased work of breathing, good air exchange, clear to auscultation bilaterally, no crackles or wheezing  Heart:  normal S1 and S2  Abdomen:  soft, non-distended and non-tender  Fetal heart rate:  Baseline Heart Rate 130, accelerations:  present  long term variability:  moderate  decelerations:  absent  Pelvis:  External Genitalia: General appearance; normal, Hair distribution; normal, Lesions absent  Cervix:    DILATION:  9 cm  EFFACEMENT:   100%  STATION:  0 cm  CONSISTENCY:  soft  POSITION:  mid    Contraction frequency:  1-6 minute  Membranes:  Intact    Pelvic Ultrasound:      General Labs:    CBC:   Lab Results   Component Value Date    WBC 17.5 2022    RBC 4.39 2022    HGB 11.7 2022    HCT 35.2 2022    MCV 80.2 2022    RDW 13.9 2022     2022       ASSESSMENT AND PLAN:    The patient is a 27 y.o.  3 parity 0020 at 44 weeks 3 days gestation  History of anxiety and depression  History of SAB x 2      Admission  Expectant management. Carolina Sierra D.O.    [facility]

## 2022-02-07 NOTE — PROGRESS NOTES
Patient's desires up to BR. Patient assisted to sitting on side of bed and allowed to \"dangle. \"  Patient denies dizziness and lightheadedness. Patient assisted up to x 2 assistants and ambulated without difficulty to BR. Patient voided 500ml blood tinged urine. Pericare taught and demonstration returned. Patient informed on postpartum pericare and instructions given of use of tucks pads and dermaplast spray. Linens and gown changed. Patient assisted back to bed and call light within reach. Patient instructed to call for assistance with next void. Patient verbalized understanding.

## 2022-02-07 NOTE — PROGRESS NOTES
Pt actively pushing. RN and  remains in continuous attendance at the bedside assessing fetal heart rate per EFM.

## 2022-02-07 NOTE — LACTATION NOTE
This note was copied from a baby's chart. Lactation Progress Note      Data:   RN requesting [facility] assistance with first breast feed. Mob is a primip. Baby skin to skin. Action: Assisted with good position at breast. Baby fussy and rooting for his hand. Left nipple inverted but some stretch to areola. LC assisted with several good suck bursts but baby unable to maintain latch and continued to be fussy. Baby moved to right breast. THis nipple is flat. Baby continues to be fussy with on and off latch. Eventually used a nipple shield to achieve a good deep latch with SRS and AS x 10 minutes and then baby fell asleep. BF education initiated. Encouraged to allow baby to go to breast ad carlos and stressed importance of always achieving a good deep latch. Also stressed importance of proper nipple shield use and that it is intended to be used short term. Encouraged to call [facility] for f/u assistance prn. LC number on board. Response: Verbalized and demonstrated understanding but will need f/u due to fatigue.

## 2022-02-07 NOTE — LACTATION NOTE
This note was copied from a baby's chart. Lactation Progress Note      Data:   RN requests f/u to offer support with latching. Baby is at the breast on consult, and tongue thrusting. Action: Introduced self as  [address] on for this evening and offered assistance. MOB agrees. Encouraged hand expression of colostrum. Several drops expressed and fed to infant. Infant with multiple attempts to latch but tongue sucking with attempts. Digital suck evaluation performed using gloved finger, infant initially tongue thrusting but improvement noted and becoming more coordinated with suck training provided. Shown to MOB and encouraged before feedings as needed if infant is uncoordinated, sucking on tongue or thrusting tongue with attempts to latch. Also, encouraged much STS contact. Attempted to offer the breast again with the nipple shield at bedside, infant continued to struggle with latching. Small shield brought to bedside, and reviewed with mom how to appropriately apply and importance of deep latch. FLAVIO achieved with small shield with SRS and few AS. Discussed signs of deep vs shallow latch with shield, and encouraged holding baby close to the breast in order to sustain a deep latch. Breast feeding education reinforced including breast care, when to expect mature milk supply, expected  feeding behaviors during the first 24-48 hours of life, and how to know infant is getting enough at the breast including appropriate feedings, output, and weight trends. Encouraged offering the breast exclusively, when infant is first begining to wake and show hunger cues, and every 3 hours if baby is sleepy and without cues. Gave tips to wake sleepy baby as needed. Encouraged STS and hand expression of colostrum when offering the breast. Name and number provided on whiteboard. Encouraged to call for  AURSOS to assess latch and for f/u support prn. Response: Verbalized understanding of teaching provided. Infant continues nursing well. Will call for f/u support prn.

## 2022-02-08 LAB
BASOPHILS ABSOLUTE: 0 K/UL (ref 0–0.2)
BASOPHILS RELATIVE PERCENT: 0.3 %
EOSINOPHILS ABSOLUTE: 0 K/UL (ref 0–0.6)
EOSINOPHILS RELATIVE PERCENT: 0.2 %
HCT VFR BLD CALC: 27.1 % (ref 36–48)
HEMOGLOBIN: 8.8 G/DL (ref 12–16)
LYMPHOCYTES ABSOLUTE: 2.5 K/UL (ref 1–5.1)
LYMPHOCYTES RELATIVE PERCENT: 16.3 %
MCH RBC QN AUTO: 26.9 PG (ref 26–34)
MCHC RBC AUTO-ENTMCNC: 32.5 G/DL (ref 31–36)
MCV RBC AUTO: 82.9 FL (ref 80–100)
MONOCYTES ABSOLUTE: 1 K/UL (ref 0–1.3)
MONOCYTES RELATIVE PERCENT: 6.6 %
NEUTROPHILS ABSOLUTE: 11.9 K/UL (ref 1.7–7.7)
NEUTROPHILS RELATIVE PERCENT: 76.6 %
PDW BLD-RTO: 14 % (ref 12.4–15.4)
PLATELET # BLD: 252 K/UL (ref 135–450)
PMV BLD AUTO: 7.6 FL (ref 5–10.5)
RBC # BLD: 3.27 M/UL (ref 4–5.2)
RPR: NORMAL
WBC # BLD: 15.5 K/UL (ref 4–11)

## 2022-02-08 PROCEDURE — 6370000000 HC RX 637 (ALT 250 FOR IP): Performed by: OBSTETRICS & GYNECOLOGY

## 2022-02-08 PROCEDURE — 85025 COMPLETE CBC W/AUTO DIFF WBC: CPT

## 2022-02-08 PROCEDURE — 36415 COLL VENOUS BLD VENIPUNCTURE: CPT

## 2022-02-08 PROCEDURE — 86592 SYPHILIS TEST NON-TREP QUAL: CPT

## 2022-02-08 PROCEDURE — 1220000000 HC SEMI PRIVATE OB R&B

## 2022-02-08 PROCEDURE — 99024 POSTOP FOLLOW-UP VISIT: CPT | Performed by: OBSTETRICS & GYNECOLOGY

## 2022-02-08 RX ADMIN — IBUPROFEN 800 MG: 800 TABLET, FILM COATED ORAL at 20:11

## 2022-02-08 RX ADMIN — ACETAMINOPHEN 650 MG: 325 TABLET ORAL at 06:16

## 2022-02-08 RX ADMIN — DOCUSATE SODIUM 100 MG: 100 CAPSULE, LIQUID FILLED ORAL at 08:22

## 2022-02-08 RX ADMIN — ACETAMINOPHEN 650 MG: 325 TABLET ORAL at 14:22

## 2022-02-08 RX ADMIN — DOCUSATE SODIUM 100 MG: 100 CAPSULE, LIQUID FILLED ORAL at 20:12

## 2022-02-08 RX ADMIN — IBUPROFEN 800 MG: 800 TABLET, FILM COATED ORAL at 08:22

## 2022-02-08 RX ADMIN — FERROUS SULFATE TAB 325 MG (65 MG ELEMENTAL FE) 325 MG: 325 (65 FE) TAB at 11:22

## 2022-02-08 ASSESSMENT — PAIN SCALES - GENERAL
PAINLEVEL_OUTOF10: 5
PAINLEVEL_OUTOF10: 4
PAINLEVEL_OUTOF10: 5

## 2022-02-08 NOTE — PROGRESS NOTES
Final    Comment: Rapid NAAT:   Negative results should be treated as presumptive and,  if inconsistent with clinical signs and symptoms or necessary for  patient management, should be tested with an alternative molecular  assay. Negative results do not preclude SARS-CoV-2 infection and  should not be used as the sole basis for patient management decisions. This test has been authorized by the FDA under an Emergency Use  Authorization (EUA) for use by authorized laboratories. Fact sheet for Healthcare Providers:  BuildHer.es  Fact sheet for Patients: BuildHer.es    METHODOLOGY: Isothermal Nucleic Acid Amplification      WBC 02/08/2022 15.5* 4.0 - 11.0 K/uL Final    RBC 02/08/2022 3.27* 4.00 - 5.20 M/uL Final    Hemoglobin 02/08/2022 8.8* 12.0 - 16.0 g/dL Final    Hematocrit 02/08/2022 27.1* 36.0 - 48.0 % Final    MCV 02/08/2022 82.9  80.0 - 100.0 fL Final    MCH 02/08/2022 26.9  26.0 - 34.0 pg Final    MCHC 02/08/2022 32.5  31.0 - 36.0 g/dL Final    RDW 02/08/2022 14.0  12.4 - 15.4 % Final    Platelets 82/14/9689 252  135 - 450 K/uL Final    MPV 02/08/2022 7.6  5.0 - 10.5 fL Final    Neutrophils % 02/08/2022 76.6  % Final    Lymphocytes % 02/08/2022 16.3  % Final    Monocytes % 02/08/2022 6.6  % Final    Eosinophils % 02/08/2022 0.2  % Final    Basophils % 02/08/2022 0.3  % Final    Neutrophils Absolute 02/08/2022 11.9* 1.7 - 7.7 K/uL Final    Lymphocytes Absolute 02/08/2022 2.5  1.0 - 5.1 K/uL Final    Monocytes Absolute 02/08/2022 1.0  0.0 - 1.3 K/uL Final    Eosinophils Absolute 02/08/2022 0.0  0.0 - 0.6 K/uL Final    Basophils Absolute 02/08/2022 0.0  0.0 - 0.2 K/uL Final       Assessment/Plan:  Eduard Alba is a 27 y.o. D2B0033 status-post  uncomplicated Vaginal Delivery. 1. PPD #1: doing well, routine care  - 3rd degree laceration, discussed importance of bowel regimen  2. Infant: M, 7/8, 2860g  - s/p circ  3.  Labs: A+/ab-, RI, GBS neg  4.  Anxiety/depression  - monitor while in-house    Dispo: d/c home PPD2 if course uncomplicated    Karthik Lopez MD  [facility]

## 2022-02-08 NOTE — FLOWSHEET NOTE
Infants parents requested infant to be taken to the nursery so they can sleep. Infant taken to nursery.

## 2022-02-09 VITALS
DIASTOLIC BLOOD PRESSURE: 64 MMHG | TEMPERATURE: 98.2 F | SYSTOLIC BLOOD PRESSURE: 110 MMHG | BODY MASS INDEX: 35.08 KG/M2 | HEART RATE: 82 BPM | WEIGHT: 174 LBS | OXYGEN SATURATION: 98 % | HEIGHT: 59 IN | RESPIRATION RATE: 16 BRPM

## 2022-02-09 PROCEDURE — 6370000000 HC RX 637 (ALT 250 FOR IP): Performed by: OBSTETRICS & GYNECOLOGY

## 2022-02-09 PROCEDURE — 99024 POSTOP FOLLOW-UP VISIT: CPT | Performed by: OBSTETRICS & GYNECOLOGY

## 2022-02-09 RX ORDER — HYDROCODONE BITARTRATE AND ACETAMINOPHEN 5; 325 MG/1; MG/1
1 TABLET ORAL EVERY 6 HOURS PRN
Qty: 12 TABLET | Refills: 0 | Status: SHIPPED | OUTPATIENT
Start: 2022-02-09 | End: 2022-02-12

## 2022-02-09 RX ORDER — FERROUS SULFATE 325(65) MG
325 TABLET ORAL
Qty: 30 TABLET | Refills: 3 | Status: SHIPPED | OUTPATIENT
Start: 2022-02-09 | End: 2022-05-02

## 2022-02-09 RX ORDER — DOCUSATE SODIUM 100 MG/1
100 CAPSULE, LIQUID FILLED ORAL 2 TIMES DAILY
Qty: 60 CAPSULE | Refills: 1 | Status: SHIPPED | OUTPATIENT
Start: 2022-02-09 | End: 2022-05-02

## 2022-02-09 RX ORDER — IBUPROFEN 800 MG/1
800 TABLET ORAL EVERY 8 HOURS PRN
Qty: 60 TABLET | Refills: 0 | Status: SHIPPED | OUTPATIENT
Start: 2022-02-09 | End: 2022-05-02

## 2022-02-09 RX ADMIN — DOCUSATE SODIUM 100 MG: 100 CAPSULE, LIQUID FILLED ORAL at 09:53

## 2022-02-09 RX ADMIN — IBUPROFEN 800 MG: 800 TABLET, FILM COATED ORAL at 04:34

## 2022-02-09 RX ADMIN — BENZOCAINE AND LEVOMENTHOL: 200; 5 SPRAY TOPICAL at 09:53

## 2022-02-09 RX ADMIN — ACETAMINOPHEN 650 MG: 325 TABLET ORAL at 05:55

## 2022-02-09 ASSESSMENT — PAIN SCALES - GENERAL
PAINLEVEL_OUTOF10: 3
PAINLEVEL_OUTOF10: 5

## 2022-02-09 NOTE — PROGRESS NOTES
Discharge Phone Call    Patient Name: Areaa Home     [facility] Care Provider: [address] DO Discharge Date: 2022    Disposition of baby:    Phone Number: [phone] (home)     Attempts to Contact:  Date:    Caller  Date:    Caller  Date:    Caller    Information for the patient's :  Gisselle Luna [[id number]]   Delivery Method: Vaginal, Spontaneous       1. Now that you are at home is your pain being well controlled? Y/N   If no, instruct to call       provider. 2. Are you breastfeeding? Y/N    Do you need any extra support from our lactation staff? Y/N    If yes, provide number for lactation. 3. Have you made or already had your first appointment with the baby's doctor? Y/N   If no, do      you know when to schedule it? Y/N    4. Have you scheduled your follow-up appointment? Y/N  If no, do you know when to schedule       it? Y/N   If no, they can find it on printed discharge instructions. 5. Did staff discuss safe sleep during your stay? Y/N   6. Did we explain things in a way you could understand? Y/N  7. Were we respectful of your preferences for labor and birth and include you in the plan of       care? Y/N  If no, please explain _______________________________________________  8. Is there anyone in particular you would like to mention who provided care for you? _______      _________________________________________________________________________     9. Were you given a Post-Birth Warning Signs handout? Y/N  Do you have it somewhere      easily accessible? Y/N  If no, please send them a copy and ask them to put it somewhere      easily found. 10. Have you been crying excessively, having anger or mood swings that feel out of control, or       feel like you can't cope with caring for yourself or baby? Y/N   If yes, they may be showing       signs of postpartum depression and should call provider.  There is also a        depression test on page C5 in their discharge booklet they can take. 13. Do you have any other questions or concerns I can address today?  Y/N  ______________      _________________________________________________________________________    Information provided during call :_________________________________________________  ___________________________________________________________________________    Call completed by:____________________________    Date:_________ Time:___________

## 2022-02-09 NOTE — PROGRESS NOTES
[facility]  Post Partum Progress Note    Subjective:   Jaison Yun is a 27 y.o. [id number] s/p  at 39w3d, PPD #2. Pregnancy is complicated by anxiety, depression, back pain and nausea and vomiting. Patient is doing well today without complaints. Reports lochia is decreasing. Pain is well controlled. Tolerating regular diet without nausea or vomiting. Ambulating without difficulty, denies dizziness on standing. Voiding without difficulty. Denies headache, vision changes, RUQ pain. Denies chest pain, shortness of breath, fever, chills, calf pain. Postpartum Depression: High Risk    Last EPDS Total Score: 15    Last EPDS Self Harm Result: Never        Objective:   Vitals:    22 0434   BP: (!) 100/58   Pulse: 97   Resp: 16   Temp: 98.1 °F (36.7 °C)   SpO2:         GENERAL APPEARANCE: alert, well appearing, in no apparent distress  LUNGS: clear to auscultation, no wheezes, rales or rhonchi, symmetric air entry  HEART: regular rate and rhythm, no murmurs  ABDOMEN POSTPARTUM: Soft, non-tender, non-distended. No rebound or guarding. Fundus firm and non-tender below umbilicus. EXTREMITIES: no redness or tenderness in the calves or thighs, trace edema bilaterally    Impression: S/P Vaginal Delivery    Pertinent Labs:   Lab Results   Component Value Date    WBC 15.5 (H) 2022    HGB 8.8 (L) 2022    HCT 27.1 (L) 2022    MCV 82.9 2022     2022    4    Assessment / Plan:  Jaison Yun is a 27 y.o. [id number] s/p  at 39w3d     1. PPD #2     - Doing well     - Meeting milestones     - Continue routine care    2. 3rd degree perineal laceration     - Reports pain well controlled     - Reviewed importance of bowel regimen at home     - Rx for colace given    3.  Acute blood loss anemia     - Hgb 8.8     - Bleeding during delivery secondary to 3rd degree, bilateral sulcal and cervical lacerations     - VSS     - Asymptomatic     - Home with Iron and Colace    4. Depression     - EPDS 15, never with intent to harm onesself     - Patient has a longstanding history throughout pregnancy of similar scores and symptoms     - Has declined therapy thus far     - Patient is aware of such as an option     - Given crisis precautions     - Will have close follow-up in 1 week for mood check    5. Pumping with formula supplementation     - Reviewed importance of pumping consistently to establish milk supply while supplementing with formula    6.  Fetal information     - Delivery date/time: 2/7/2022 at 1105     - Gender: Male     - Weight: 6lbs 4.9oz     - Apgars: 7 & 8      - Circumcision: Performed prior to discharge    Disposition:   Stable for discharge home  Discharge instructions reviewed    Follow Up in the office in 1 weeks  Given Rx for Ibuprofen and Norco for pain   Given Rx for Iron and colace for anemia    Arlena Kanner, DO

## 2022-02-09 NOTE — PROGRESS NOTES
Dr Jes Mendez notified of PPD score of 15. Spoke with patient at length regarding score. Patient states verbally and written that she has no thoughts of harm to herself or baby at this time. Pt. Doesn't offer a lot of communication regarding her feelings but she states she has had ongoing depression and does not wish to see a therapist or be prescribed any medications. Instructed for patient to follow up next week for her postpartum check up in office as instructed by Dr Jes Mendez to make sure mental health concerns, if any, are addressed early. Patient reports understanding and agreement.

## 2022-02-09 NOTE — CARE COORDINATION
Social Work Consult/Assessment    Reason for Consult: pt. with mild autism, first baby, postpartum depression score of 15 with history of depression  Electronic record reviewed: yes   Delivery information: Baby boy \" Dimitris Cabello\"  Marital Status:    Mob's UDS on admission: Negative   Infant's UDS/Cord tox: not ordered     Spoke with Mob today explained SW services. Present in the room: MOB holding baby and father of baby entered as finishing assessment   Living situation: Baby, FOB, MOB  Address and phone: [facility] [address] [place], [facility] [phone]  Spouse or significant other:Ralph Destiney   Children: 1st time mother   Children's Protective Services involvement: NA  Support system: FOB and family   Domestic Violence: Denies reports feeling safe at home   Mental Health: Anxiety and depression- reviewed resc declined making referral, but did accept resources    Post Partum Depression: aware of s/sx  Substance Abuse: Denies   Social Assistance Programs: info provided reports does not think will need   Supplies: Has crib, bassinet, car seat and bottles   Every Child Succeeds: Reviewed is agreeable to referral made this date     Summary: Baby will discharge home when medically stable with MOB/FOB. MOB reports has supplies need at home. MOB declined writer making a referral for counseling but did accept information. MOB was agreeable to referral to every child succeeds, made this date. KATHLEEN Bella

## 2022-02-09 NOTE — FLOWSHEET NOTE
Lactation Progress Note      Data:     Introduced self to patient as lactation support for the night. Patient changing diaper so I stepped in to help. I picked baby up while MOB washed her hands. RN spoke with me and told me she thought she was bottle feeding. MOB states she does want to breast feed but gave baby a bottle last night. Action:   I explained to the MOB by giving a bottle and pacifiers that doesn't help breastfeeding if she truly wants to breast feed. I explained we are here to support her whatever her decision is but she needs to make a decision because she may lose her supply. I explained that she has to feed the baby bottom line. I also explained if she wants to breast feed she needs to start pumping especially if baby is not latching. Name and number on board. Encouraged to call prn. Response: PT seems attentive and states she will probably call prn.

## 2022-02-09 NOTE — DISCHARGE SUMMARY
[facility]  Obstetric Discharge Summary        Admitting Diagnosis:   1. Marianne Winn is a 27 y.o.  at 39w3d   2. A pos / RI / GBS neg  3. Spontaneous labor  4. Anxiety/depression    Discharge Diagnosis:  1. Marianne Winn is a 27 y.o.  at 39w3d   2. A pos / RI / GBS neg  3. Third degree perineal laceration, cervical laceration  4. Anxiety/depression   5. Acute blood loss anemia    Procedures:   1.   2. Repair of 3rd degree perineal laceration    Referrals:    - Lactation Consultant       Information:      - Delivery date/time: 2022 at 1105     - Gender: Male     - Weight: 6lbs 4.9oz     - Apgars: 7 & 8      - Fetal feeding: Formula and Pumping     - Circumcision: Performed prior to discharge    Discharge Instructions:  Please call for increased pain not controlled by pain medications, significant vaginal bleeding greater than 1 pad/hour, temperature greater than 101 degrees F or any other concerns. Plan to follow up in 1 weeks. Diet:common adult    Activity:  Increase activity gradually. No heavy lifting or driving for 1 week. Pelvic rest for 6 weeks. No intercourse, tampons, douching, baths.        Medications:   - Motrin    - Norco   - Iron / Colace     Disposition: Home    Condition on discharge: Stable    Follow up: in 1 week(s)    Perez Miller DO

## 2022-02-09 NOTE — FLOWSHEET NOTE
Lactation Progress Note      Data:    Gisela Swenson has been given a bottle from birth but mob states she wants to breastfeed. Mob has flat nipples. Baby not interested in breast or latching. Action:   I encouraged MOB to pump if she really wants to breast feed so she doesn't lose her supply. Set MOB up with pump and told her she has to pump every 3 hours. Pumping at this time. Number on board. Educated MOB on pump, pump parts and cleaning the parts. Response:   No further questions at this time.

## 2022-02-18 ENCOUNTER — POSTPARTUM VISIT (OUTPATIENT)
Dept: OBGYN CLINIC | Age: 32
End: 2022-02-18

## 2022-02-18 VITALS
WEIGHT: 160.2 LBS | HEART RATE: 103 BPM | SYSTOLIC BLOOD PRESSURE: 120 MMHG | DIASTOLIC BLOOD PRESSURE: 74 MMHG | TEMPERATURE: 98.1 F | BODY MASS INDEX: 32.36 KG/M2

## 2022-02-18 DIAGNOSIS — O99.340 DEPRESSION AFFECTING PREGNANCY: ICD-10-CM

## 2022-02-18 DIAGNOSIS — F41.9 ANXIETY DURING PREGNANCY: ICD-10-CM

## 2022-02-18 DIAGNOSIS — F32.A DEPRESSION AFFECTING PREGNANCY: ICD-10-CM

## 2022-02-18 DIAGNOSIS — O99.340 ANXIETY DURING PREGNANCY: ICD-10-CM

## 2022-02-18 PROCEDURE — 0503F POSTPARTUM CARE VISIT: CPT | Performed by: OBSTETRICS & GYNECOLOGY

## 2022-02-18 NOTE — PROGRESS NOTES
OB Postpartum Visit    HPI:   Patient is a 27 y.o. [id number] s/p vaginal delivery here for her postpartum visit:     Pt doing well today. Bleeding staining only. Bowel function is normal. Bladder function is normal.   Patient is not sexually active. Contraception method is none. EDPS: declined to complete --- states her moods have improved since having her baby / decrease in anxiety. Baby has been doing well without problems. Baby is feeding both breast and bottle.      Review of Systems - The following ROS was otherwise negative, except as noted in the HPI: constitutional, respiratory, cardiovascular, gastrointestinal, genitourinary    OB History  OB History    Para Term  AB Living   3 1 1   2 1   SAB IAB Ectopic Molar Multiple Live Births   2       0 1      # Outcome Date GA Lbr Butch/2nd Weight Sex Delivery Anes PTL Lv   3 Term 22 39w3d 13:25 / 01:40 6 lb 4.9 oz (2.86 kg) M Vag-Spont Nitrous Oxid N STANLEY      Birth Comments: peterson HUGS:229 [id number]   2 2021               Medications:  Current Outpatient Medications on File Prior to Visit   Medication Sig Dispense Refill    Prenatal Vit-Fe Fum-FA-Omega (PRENATAL MULTI +DHA) 27-0.8-228 MG CAPS TAKE 1 CAPSULE BY MOUTH EVERY DAY 30 capsule 8    montelukast (SINGULAIR) 10 MG tablet TAKE 1 TABLET BY MOUTH EVERY DAY AT NIGHT 90 tablet 1    ibuprofen (ADVIL;MOTRIN) 800 MG tablet Take 1 tablet by mouth every 8 hours as needed for Pain 60 tablet 0    ferrous sulfate (IRON 325) 325 (65 Fe) MG tablet Take 1 tablet by mouth daily (with breakfast) 30 tablet 3    docusate sodium (COLACE) 100 MG capsule Take 1 capsule by mouth 2 times daily 60 capsule 1    famotidine (PEPCID) 20 MG tablet TAKE 1 TABLET BY MOUTH 2 TIMES DAILY AS NEEDED (HEARTBURN) 60 tablet 0    albuterol sulfate HFA (PROVENTIL HFA) 108 (90 Base) MCG/ACT inhaler Inhale 2 puffs into the lungs every 4 hours as needed for Wheezing 1 Inhaler 0     No current facility-administered medications on file prior to visit. Objective:  /74 (Site: Left Upper Arm, Position: Sitting, Cuff Size: Medium Adult)   Pulse 103   Temp 98.1 °F (36.7 °C) (Infrared)   Wt 160 lb 3.2 oz (72.7 kg)   LMP 2021   Breastfeeding Yes   BMI 32.36 kg/m²   General: Alert, well appearing, no acute distress  Lungs: Resp effort normal   CV: Regular rate  Abdomen: Soft, nontender, nondistended   Pelvic: Deferred   Extremities: No redness or tenderness, neg Carl's sign  Osteopathic: no TART changes    Assessment/Plan:   Diagnosis Orders   1. Encounter for postpartum visit     2.  (normal spontaneous vaginal delivery)     3. Anxiety during pregnancy     4. Depression affecting pregnancy     5. Mother currently breast-feeding          1. Post Partum: Pt doing well post partum, meeting post partum milestones   2. Family Planning: undecided but liked her OCP (Gabi Ania) from before -- plan to start at next visit   3. Breast and Bottle feeding: Support given    4. Health Maintenance: Last pap smear: 2020 -- NILM     Follow Up:   Return in about 4 weeks (around 3/18/2022) for Post Partum Visit.      Kaden Vega, DO

## 2022-03-18 ENCOUNTER — OFFICE VISIT (OUTPATIENT)
Dept: FAMILY MEDICINE CLINIC | Age: 32
End: 2022-03-18
Payer: COMMERCIAL

## 2022-03-18 ENCOUNTER — POSTPARTUM VISIT (OUTPATIENT)
Dept: OBGYN CLINIC | Age: 32
End: 2022-03-18

## 2022-03-18 VITALS
WEIGHT: 154.6 LBS | SYSTOLIC BLOOD PRESSURE: 118 MMHG | DIASTOLIC BLOOD PRESSURE: 80 MMHG | HEART RATE: 84 BPM | BODY MASS INDEX: 31.23 KG/M2 | TEMPERATURE: 97.8 F

## 2022-03-18 VITALS
RESPIRATION RATE: 16 BRPM | HEART RATE: 87 BPM | WEIGHT: 158 LBS | SYSTOLIC BLOOD PRESSURE: 116 MMHG | OXYGEN SATURATION: 99 % | HEIGHT: 59 IN | BODY MASS INDEX: 31.85 KG/M2 | TEMPERATURE: 95.9 F | DIASTOLIC BLOOD PRESSURE: 80 MMHG

## 2022-03-18 DIAGNOSIS — F32.A DEPRESSION AFFECTING PREGNANCY: ICD-10-CM

## 2022-03-18 DIAGNOSIS — F41.9 ANXIETY DURING PREGNANCY: ICD-10-CM

## 2022-03-18 DIAGNOSIS — O99.340 DEPRESSION AFFECTING PREGNANCY: ICD-10-CM

## 2022-03-18 DIAGNOSIS — Z30.011 ENCOUNTER FOR INITIAL PRESCRIPTION OF CONTRACEPTIVE PILLS: ICD-10-CM

## 2022-03-18 DIAGNOSIS — R10.2 VAGINAL PAIN: ICD-10-CM

## 2022-03-18 DIAGNOSIS — R07.89 OTHER CHEST PAIN: ICD-10-CM

## 2022-03-18 DIAGNOSIS — F41.8 ANXIETY ABOUT HEALTH: ICD-10-CM

## 2022-03-18 DIAGNOSIS — O99.340 ANXIETY DURING PREGNANCY: ICD-10-CM

## 2022-03-18 PROCEDURE — 0503F POSTPARTUM CARE VISIT: CPT | Performed by: OBSTETRICS & GYNECOLOGY

## 2022-03-18 PROCEDURE — 99214 OFFICE O/P EST MOD 30 MIN: CPT | Performed by: NURSE PRACTITIONER

## 2022-03-18 RX ORDER — ACETAMINOPHEN AND CODEINE PHOSPHATE 120; 12 MG/5ML; MG/5ML
1 SOLUTION ORAL DAILY
Qty: 28 TABLET | Refills: 11 | Status: SHIPPED | OUTPATIENT
Start: 2022-03-18 | End: 2022-09-07 | Stop reason: ALTCHOICE

## 2022-03-18 ASSESSMENT — ENCOUNTER SYMPTOMS
COUGH: 0
VOMITING: 0
DIARRHEA: 0
SHORTNESS OF BREATH: 0
NAUSEA: 0

## 2022-03-18 ASSESSMENT — PATIENT HEALTH QUESTIONNAIRE - PHQ9
9. THOUGHTS THAT YOU WOULD BE BETTER OFF DEAD, OR OF HURTING YOURSELF: 0
SUM OF ALL RESPONSES TO PHQ QUESTIONS 1-9: 12
2. FEELING DOWN, DEPRESSED OR HOPELESS: 2
SUM OF ALL RESPONSES TO PHQ QUESTIONS 1-9: 12
5. POOR APPETITE OR OVEREATING: 2
SUM OF ALL RESPONSES TO PHQ QUESTIONS 1-9: 12
SUM OF ALL RESPONSES TO PHQ9 QUESTIONS 1 & 2: 3
10. IF YOU CHECKED OFF ANY PROBLEMS, HOW DIFFICULT HAVE THESE PROBLEMS MADE IT FOR YOU TO DO YOUR WORK, TAKE CARE OF THINGS AT HOME, OR GET ALONG WITH OTHER PEOPLE: 1
3. TROUBLE FALLING OR STAYING ASLEEP: 3
4. FEELING TIRED OR HAVING LITTLE ENERGY: 2
7. TROUBLE CONCENTRATING ON THINGS, SUCH AS READING THE NEWSPAPER OR WATCHING TELEVISION: 1
1. LITTLE INTEREST OR PLEASURE IN DOING THINGS: 1
SUM OF ALL RESPONSES TO PHQ QUESTIONS 1-9: 12
8. MOVING OR SPEAKING SO SLOWLY THAT OTHER PEOPLE COULD HAVE NOTICED. OR THE OPPOSITE, BEING SO FIGETY OR RESTLESS THAT YOU HAVE BEEN MOVING AROUND A LOT MORE THAN USUAL: 1
6. FEELING BAD ABOUT YOURSELF - OR THAT YOU ARE A FAILURE OR HAVE LET YOURSELF OR YOUR FAMILY DOWN: 0

## 2022-03-18 NOTE — PROGRESS NOTES
3/18/2022     Chief Complaint   Patient presents with    Depression     Postpartum Concern     Vanessa Nair (:  1990) is a 34 y.o. female, here for evaluation of the following medical concerns:    HPI    Postpartum, almost 6 weeks. Wants to discuss birth experience. Has some anger and sadness regarding birth experience. Felt unprepared and uneducated about the birthing process. Felt she was tricked into a delivery she did not want. Wanted to deliver on hands and knees not on her back but ended up delivering on her back and tearing. Denies thoughts of self harm or harming her baby. Feels happy to wake up and see her baby. Denies feeling hopeless or excessive crying. The baby is colicky but recently started use colick ease and that seems to help. Thinking about taking the baby to chiropractor- asked my opinion and I suggested she discuss that with her pediatrician. Does have good support system at home but sometimes she feels like she can not talk to them about her birthing experience. She wants a referral to somebody that she can talk to about this and does not want to have to go see several different therapist.  I offered to get her an appointment with Shashank Vasquez but discussed that that would only be for short-term so she preferred to be referred to somebody out in the community. Reports sharp perineal pain, feels like there is a sign flap where she was sutured from tearing during delivery. Has post-partum appointment at 0930 am today, advised to keep appointment and have ob assess. She is agreeable.      PHQ Scores 3/18/2022 2021 2021 2020 2019 2017 2017   PHQ2 Score 3 0 0 0 0 0 0   PHQ9 Score 12 0 0 0 0 0 0     Interpretation of Total Score Depression Severity: 1-4 = Minimal depression, 5-9 = Mild depression, 10-14 = Moderate depression, 15-19 = Moderately severe depression, 20-27 = Severe depression  Review of Systems   Constitutional: Negative for chills, fatigue and fever. Respiratory: Negative for cough and shortness of breath. Cardiovascular: Negative for chest pain and leg swelling. Gastrointestinal: Negative for diarrhea, nausea and vomiting. Neurological: Negative for dizziness and headaches. Psychiatric/Behavioral: Negative for agitation, behavioral problems, self-injury, sleep disturbance and suicidal ideas. All other systems reviewed and are negative. Prior to Visit Medications    Medication Sig Taking?  Authorizing Provider   ibuprofen (ADVIL;MOTRIN) 800 MG tablet Take 1 tablet by mouth every 8 hours as needed for Pain  Patient not taking: Reported on 3/18/2022 Yes Jerry Schaefer DO   ferrous sulfate (IRON 325) 325 (65 Fe) MG tablet Take 1 tablet by mouth daily (with breakfast)  Patient not taking: Reported on 3/18/2022 Yes Jerry Schaefer DO   docusate sodium (COLACE) 100 MG capsule Take 1 capsule by mouth 2 times daily  Patient not taking: Reported on 3/18/2022 Yes Jerry Schaefer DO   famotidine (PEPCID) 20 MG tablet TAKE 1 TABLET BY MOUTH 2 TIMES DAILY AS NEEDED (HEARTBURN)  Patient not taking: Reported on 3/18/2022 Yes Sapphire Bell, DO   Prenatal Vit-Fe Fum-FA-Omega (PRENATAL MULTI +DHA) 27-0.8-228 MG CAPS TAKE 1 CAPSULE BY MOUTH EVERY DAY  Patient not taking: Reported on 3/18/2022 Yes Mark Reyes DO   montelukast (SINGULAIR) 10 MG tablet TAKE 1 TABLET BY MOUTH EVERY DAY AT NIGHT  Patient not taking: Reported on 3/18/2022 Yes Nereida Joe,    albuterol sulfate HFA (PROVENTIL HFA) 108 (90 Base) MCG/ACT inhaler Inhale 2 puffs into the lungs every 4 hours as needed for Wheezing  Patient not taking: Reported on 3/18/2022 Yes Ary Bob, APRN - CNP        Social History     Tobacco Use    Smoking status: Never Smoker    Smokeless tobacco: Never Used   Substance Use Topics    Alcohol use: Not Currently     Alcohol/week: 0.0 standard drinks     Comment: occasionally        Vitals:    03/18/22 0753

## 2022-03-18 NOTE — PATIENT INSTRUCTIONS
[facility]-- Meagan Kinney  [phone] [address] [facility] [address]

## 2022-03-18 NOTE — PROGRESS NOTES
OB Postpartum Visit    HPI:   Patient is a 34 y.o. [id number] s/p vaginal delivery here for her postpartum visit:     Pt doing well today. Bleeding staining only. Bowel function is normal. Bladder function is normal.   Patient is not sexually active. Contraception method is none. She is interested in restarting her old birth control. She does not plan on having sex right now. EDPS: declined to complete. Denies MARIA TERESA. States her moods are \"fair\". I again asked if she was following with counseling and she states she is unable to make these appointments. I strongly recommended she continue to pursue counseling in the postpartum period. Baby has been doing well without problems. She does admit to baby being colicky and having trouble adjusting to the lack of sleep, understandably. She is currently pumping and bottlefeeding every 3 hours. She states baby has gained some weight but the pediatricians would like him to gain more. She admits to some vaginal pain from delivery that comes and goes. States the pain is sharp and she is able to feel the stitches pulling. Will do an exam.    We also discussed her delivery experience which she was unsatisfied with. And was frustrated that the team did not follow her birth plan (which she did not bring to the hospital with her). She recommended in the future I include the specifics in her notes. States she had a Dula but she was only there for the last 3 hours of her delivery. She was also frustrated that the team did not include her  more in managing her pain. I attempted to address all of her questions today but I sensed there is more she wanted to talk about, offered an additional appointment for further discussion.      Review of Systems - The following ROS was otherwise negative, except as noted in the HPI: constitutional, respiratory, cardiovascular, gastrointestinal, genitourinary    OB History  OB History    Para Term  AB Living   3 1 1   2 1   SAB IAB Ectopic Molar Multiple Live Births   2       0 1      # Outcome Date GA Lbr Butch/2nd Weight Sex Delivery Anes PTL Lv   3 Term 02/07/22 39w3d 13:25 / 01:40 6 lb 4.9 oz (2.86 kg) M Vag-Spont Nitrous Oxid N STANLEY      Birth Comments: peterson HUGS:229 [id number]   2 SAB 2021           1 SAB 2017               Medications:  Current Outpatient Medications on File Prior to Visit   Medication Sig Dispense Refill    ibuprofen (ADVIL;MOTRIN) 800 MG tablet Take 1 tablet by mouth every 8 hours as needed for Pain (Patient not taking: Reported on 3/18/2022) 60 tablet 0    ferrous sulfate (IRON 325) 325 (65 Fe) MG tablet Take 1 tablet by mouth daily (with breakfast) (Patient not taking: Reported on 3/18/2022) 30 tablet 3    docusate sodium (COLACE) 100 MG capsule Take 1 capsule by mouth 2 times daily (Patient not taking: Reported on 3/18/2022) 60 capsule 1    famotidine (PEPCID) 20 MG tablet TAKE 1 TABLET BY MOUTH 2 TIMES DAILY AS NEEDED (HEARTBURN) (Patient not taking: Reported on 3/18/2022) 60 tablet 0    Prenatal Vit-Fe Fum-FA-Omega (PRENATAL MULTI +DHA) 27-0.8-228 MG CAPS TAKE 1 CAPSULE BY MOUTH EVERY DAY (Patient not taking: Reported on 3/18/2022) 30 capsule 8    montelukast (SINGULAIR) 10 MG tablet TAKE 1 TABLET BY MOUTH EVERY DAY AT NIGHT (Patient not taking: Reported on 3/18/2022) 90 tablet 1    albuterol sulfate HFA (PROVENTIL HFA) 108 (90 Base) MCG/ACT inhaler Inhale 2 puffs into the lungs every 4 hours as needed for Wheezing (Patient not taking: Reported on 3/18/2022) 1 Inhaler 0     No current facility-administered medications on file prior to visit.         Objective:  /80 (Site: Left Upper Arm, Position: Sitting, Cuff Size: Medium Adult)   Pulse 84   Temp 97.8 °F (36.6 °C) (Infrared)   Wt 154 lb 9.6 oz (70.1 kg)   LMP 05/07/2021   BMI 31.23 kg/m²   General: Alert, well appearing, no acute distress  Lungs: Resp effort normal   CV: Regular rate  Abdomen: Soft, nontender, nondistended Pelvic exam: VULVA: normal appearing vulva with no masses, tenderness or lesions, VAGINA: normal appearing vagina with normal color and discharge, no lesions, vaginal laceration well-healed without any signs of infection hematoma or breakdown. Stitches still present--I recommend we let them dissolve on their own, CERVIX: normal appearing cervix without discharge or lesions, UTERUS: uterus is normal size, shape, consistency and nontender, exam chaperoned by female MA. Extremities: No redness or tenderness, neg Carl's sign  Osteopathic: no TART changes    Assessment/Plan:   Diagnosis Orders   1. Encounter for postpartum visit     2.  (normal spontaneous vaginal delivery)     3. Vaginal pain  VAGINAL PATHOGENS PROBE *A   4. Anxiety during pregnancy     5. Depression affecting pregnancy     6. Mother currently breast-feeding     7. Encounter for initial prescription of contraceptive pills  norethindrone (ORTHO MICRONOR) 0.35 MG tablet   8. Other chest pain  EKG 12 lead   9. Anxiety about health        1. Post Partum: Pt doing reasonably well post partum, meeting post partum milestones   2. Third-degree laceration: Well-healed without signs of infection or breakdown. 3. Family Planning: Micronor sent to pharmacy use precautions reviewed  4. Breast and Bottle feeding: Support given --recommended she reach out to any lactation support through her pediatrician's office as well as the hospital for continued help. 5. Health Maintenance: Last pap smear: 2020 -- NILM, plan for annual in about a year. 6.  Care concerns: Had a long discussion today about her delivery experience and things we could do differently in the future. Approximately 45 minutes spent in room counseling patient on condition and coordination of care with over 50% in direct face to face counseling. Follow Up:   Return in about 1 year (around 3/18/2023) for Annual or sooner if needed.      Kevin Storm, DO

## 2022-03-28 ENCOUNTER — NURSE TRIAGE (OUTPATIENT)
Dept: OTHER | Facility: CLINIC | Age: 32
End: 2022-03-28

## 2022-03-28 ENCOUNTER — TELEMEDICINE (OUTPATIENT)
Dept: PRIMARY CARE CLINIC | Age: 32
End: 2022-03-28
Payer: COMMERCIAL

## 2022-03-28 DIAGNOSIS — R07.89 ATYPICAL CHEST PAIN: ICD-10-CM

## 2022-03-28 DIAGNOSIS — R52 PAIN RELATED TO VAGINAL DELIVERY: Primary | ICD-10-CM

## 2022-03-28 PROCEDURE — 99215 OFFICE O/P EST HI 40 MIN: CPT | Performed by: NURSE PRACTITIONER

## 2022-03-28 ASSESSMENT — ENCOUNTER SYMPTOMS
ORTHOPNEA: 0
COUGH: 0
SHORTNESS OF BREATH: 0
ABDOMINAL PAIN: 0

## 2022-03-28 NOTE — PATIENT INSTRUCTIONS
Patient Education        Learning About Starting to Breastfeed  Planning ahead     Before your baby is born, plan ahead. Learn all you can about breastfeeding. This helps make breastfeeding easier. · Early in your pregnancy, talk to your doctor or midwife about breastfeeding. · Learn the basics before your baby is born. The staff at hospitals and birthing centers can help you find a lactation specialist. This person is often a nurse who has been trained to teach and advise about breastfeeding. Or you can take a breastfeeding class. · Plan ahead for times when you will need help after your baby is born. You may want to get help from friends and family. You can also join a support group to talk to others who breastfeed. · Buy the equipment you'll need. Examples are breast pads, nipple cream, extra pillows, and nursing bras. Find out about breast pumps too. Getting help from your hospital or birthing center  It's important to have support from the doctors, nurses, and hospital staff who care for you and your baby. Before it's time for you to give birth, ask about the breastfeeding policies at your hospital or birthing center. Look for a hospital or birthing center that has policies for:  · \"Rooming in. \" This policy encourages you to have your baby in the room with you. It can allow you to breastfeed more often. · Supplemental feedings. Tell the staff that your baby is to get only your breast milk from birth. If staff feed your baby water, sugar solution, or formula right after birth without a medical reason, it may make it harder for you to breastfeed. · Pacifiers or artificial nipples. Staff should not give your  these items. They may interfere with breastfeeding. · Follow-up. Find out if your hospital can help you with breastfeeding issues after you go home. See if you can get information on support groups or other contacts.  They might help if you need help setting up and staying with your breastfeeding routine. Your first feeding  It's best to start breastfeeding within 1 hour of birth. For each feeding, you go through these basic steps:  · Get ready for the feeding. Be calm and relaxed, and try not to be distracted. Get some water or juice for yourself. Use two or three pillows to help support your baby while nursing. · Find a breastfeeding position that is comfortable for you and your baby. Examples are the cradle and the football positions. Make sure the baby's head and chest are lined up straight and facing your breast. It's best to switch which breast you start with each time. · Get the baby latched on well. Your baby's mouth needs to be wide open, like a yawn. So you may need to gently touch the middle of your baby's lower lip. When your baby's mouth is open wide, quickly bring the baby onto your nipple and areola. The areola is the dark [place] around your nipple. · Provide a complete feeding. Let your baby decide how long to nurse. Be sure to burp your baby after each breast.  In the first days after birth, your breasts make a thick, yellow liquid called colostrum. This liquid gives your baby nutrients and antibodies against infection. It is all that babies need at first. Your breasts will fill with milk a few days after the birth. Talk to your doctor, midwife, or lactation specialist right away if you are having problems and aren't sure what to do. How often to breastfeed  Plan to breastfeed your baby on demand rather than setting a strict schedule. For the first 2 weeks, be prepared to breastfeed at least 8 times in a 24-hour period. In the first few days, you may need to wake a sleeping baby to feed. If you breastfeed more often, it will help your breasts to produce more milk. After you go home  After you're home, don't be afraid to call your doctor, midwife, or lactation specialist with questions. That's true even if you don't know what's bothering you.  They are used to parents of newborns calling. They can help you figure out if there is a problem, and if so, how to fix it. Plan for times when you will be apart from your baby. Use a breast pump to collect breast milk ahead of time. You can store milk in the refrigerator or freezer. Then it's ready when someone else will be taking care of your baby. Experts recommend waiting about a month until breastfeeding is going well before offering a bottle. Breastfeeding is a learned skill that gets easier over time. You are more likely to succeed if you plan ahead, learn the basic techniques, and know where to get help and support. Where can you learn more? Go to https://Explore Engagepepiceweb.Bellabox. org and sign in to your [employer] account. Enter N657 in the Adapt box to learn more about \"Learning About Starting to Breastfeed. \"     If you do not have an account, please click on the \"Sign Up Now\" link. Current as of: June 16, 2021               Content Version: 13.1  © 2006-2021 Healthwise, Incorporated. Care instructions adapted under license by [facility] [address] If you have questions about a medical condition or this instruction, always ask your healthcare professional. [place] [address] any warranty or liability for your use of this information.

## 2022-03-28 NOTE — PROGRESS NOTES
Brigido Dugan (:  1990) is a Established patient, here for evaluation of the following:    Assessment & Plan   Below is the assessment and plan developed based on review of pertinent history, physical exam, labs, studies, and medications. 1. Pain related to vaginal delivery-   Provided the name and number of a new OB/GYN as well as a group of midwives per patient request. Recommend f/u asap with new provider to discuss vaginal pain and to have a pelvic exam.  2. Atypical chest pain-  Recommend having EKG completed as advised by OB/GYN and explained reasons why she needs to have this done. Provided number for central scheduling and recommended having results sent to Dr. Krista Katz as well as Dr. Audra Valenzuela. 3. Mother currently breast-feeding  Discussed skin to skin contact, using a nipple shield, pumping and feeding but still allowing baby to feed for comfort and bonding. Recommended following up with support group or counseling. Patient was reminded to go to the hospital for chest pain that is severe, persistent, or different than what she normally experiences, or if she has shortness of breath, severe bleeding, or pain. Patient verbalized understanding. Unable to schedule a follow up with primary care provider as the visit was terminated due to technological failure and patient was feeding her son when contact was re-established. Recommended calling to get an appointment this week. Return in about 1 week (around 2022) for chest pain, vaginal pain. Subjective   Vaginal Pain  This is a chronic problem. The current episode started more than 1 month ago. The problem occurs constantly. The problem has been gradually improving. The pain is moderate. She is not pregnant. Pertinent negatives include no abdominal pain or fever. The symptoms are aggravated by bowel movements and urinating. She has tried nothing for the symptoms.  Sexual activity: not currently sexually active  She uses oral Discharge [x]  None visible   [] Abnormal -     HENT: [x] Normocephalic, atraumatic  [] Abnormal -   [x] Mouth/Throat: Mucous membranes are moist    External Ears [x] Normal  [] Abnormal -    Neck: [x] No visualized mass [] Abnormal -     Pulmonary/Chest: [x] Respiratory effort normal   [x] No visualized signs of difficulty breathing or respiratory distress        [] Abnormal -      Musculoskeletal:   [x] Normal gait with no signs of ataxia         [x] Normal range of motion of neck        [] Abnormal -     Neurological:        [x] No Facial Asymmetry (Cranial nerve 7 motor function) (limited exam due to video visit)          [x] No gaze palsy        [] Abnormal -          Skin:        [x] No significant exanthematous lesions or discoloration noted on facial skin         [] Abnormal -            Psychiatric:       [x] Normal Affect [] Abnormal -        [x] No Hallucinations    Other pertinent observable physical exam findings:-             On this date 3/28/2022 I have spent 50 minutes reviewing previous notes, test results and face to face (virtual) with the patient discussing the diagnosis and importance of compliance with the treatment plan as well as documenting on the day of the visit. >50% of the time was spent in direct face to face counseling. Sarah Ron, was evaluated through a synchronous (real-time) audio-video encounter. The patient (or guardian if applicable) is aware that this is a billable service, which includes applicable co-pays. This Virtual Visit was conducted with patient's (and/or legal guardian's) consent. The visit was conducted pursuant to the emergency declaration under the [address] [place] [address] authority and the [employer] General Act. Patient identification was verified, and a caregiver was present when appropriate.  The patient was located at home in a state where the provider was licensed to provide care.        --SUZETTE Young - CNP

## 2022-03-28 NOTE — TELEPHONE ENCOUNTER
Received call from [facility] with Red Flag Complaint. Subjective: Caller states \"gave birth in February and had a 3rd degree tear, where the stitches were I am having sharp pain, starting bleeding a couple days ago and passing clots and is having sharp pains in my chest, OB said I had a vaginal infection and prescribed me some antibiotics\"     Current Symptoms: chest pains, passing clots, sharp vaginal pains    Onset: 2 days ago; gradual, worsening    Associated Symptoms: reduced activity    Pain Severity: 7/10; stabbing; constant, waxing and waning    Temperature: denies    What has been tried: ibuprofen does not help    LMP: 3/2022 Pregnant: No    Recommended disposition: See HCP within 4 Hours (or PCP triage) for further evaluation of pain at episiotomy site, passing clots, having sharp vaginal pains and intermittent chest pain. If unable to be scheduled, caller advised to go to [facility]. Caller states that she would rather just be scheduled. Caller states that she tried to schedule with her OB but they stated that they would no longer see her without explanation. Care advice provided, patient verbalizes understanding; denies any other questions or concerns; instructed to call back for any new or worsening symptoms. Patient/Caller agrees with recommended disposition; writer provided warm transfer to Media Friend at [facility] for appointment scheduling     Attention Provider: Thank you for allowing me to participate in the care of your patient. The patient was connected to triage in response to information provided to the ECC/PSC. Please do not respond through this encounter as the response is not directed to a shared pool.         Reason for Disposition   [1] SEVERE pain at episiotomy site AND [2] not relieved by pain medications    Protocols used: POSTPARTUM - EPISIOTOMY SYMPTOMS-ADULT-AH

## 2022-03-28 NOTE — LETTER
I had the pleasure of seeing Heaven Storm today for a primary care virtualist video visit secondary to vaginal pain, chest pain, breast feeding difficulties. I have provided the following recommendations: complete the EKG that was ordered, find a new OB/GYN, and follow up with her counselor. I have included my note for your review and have asked the patient to follow up with you within a week. If you have questions, please reach out via [employer] secure messaging by searching for the [facility] Primary Care Virtualists. Your communication will be answered promptly by the Virtualist on service for the day. Additionally, we would love your overall feedback on this visit. Please hit shift and click the following link to let us know if the Virtualist service met your expectations. [facility].[employer]. [email or web address]      Electronically signed by SUZETTE Hagan CNP on 3/28/22 at 7:31 PM EDT

## 2022-03-29 ENCOUNTER — HOSPITAL ENCOUNTER (OUTPATIENT)
Age: 32
Discharge: HOME | End: 2022-03-29
Payer: COMMERCIAL

## 2022-03-29 DIAGNOSIS — R07.89 OTHER CHEST PAIN: ICD-10-CM

## 2022-03-29 LAB
EKG ATRIAL RATE: 75 BPM
EKG DIAGNOSIS: NORMAL
EKG P AXIS: 30 DEGREES
EKG P-R INTERVAL: 120 MS
EKG Q-T INTERVAL: 368 MS
EKG QRS DURATION: 86 MS
EKG QTC CALCULATION (BAZETT): 410 MS
EKG R AXIS: 27 DEGREES
EKG T AXIS: 12 DEGREES
EKG VENTRICULAR RATE: 75 BPM

## 2022-03-29 PROCEDURE — 93005 ELECTROCARDIOGRAM TRACING: CPT

## 2022-03-29 PROCEDURE — 93010 ELECTROCARDIOGRAM REPORT: CPT | Performed by: INTERNAL MEDICINE

## 2022-03-30 ENCOUNTER — TELEPHONE (OUTPATIENT)
Dept: OBGYN CLINIC | Age: 32
End: 2022-03-30

## 2022-03-30 NOTE — TELEPHONE ENCOUNTER
Returned patients call to discuss why she is not able to schedule an appointment with the office. Informed patient that we have received multiple complaints from her about 3 of our providers and with the delivery care for her baby, we have a breakdown of patient physician relationship. If she is currently having a problem and she is not able to schedule with PCP she is to report to the ED or urgent care for treatment. In review of her chart and the complaints that none of the physicians or staff took care of her, did not answer questions, and did not listen to her during the delivery legal has agreed there is a breakdown of patient physician relationship per the patients own words. Thanked patient for allowing her to let us take care of her for her first pregnancy, and we hope that she finds someone that she is comfortable with and takes care of her needs. Patient voiced understanding.

## 2022-04-08 DIAGNOSIS — Z30.011 ENCOUNTER FOR INITIAL PRESCRIPTION OF CONTRACEPTIVE PILLS: ICD-10-CM

## 2022-04-08 RX ORDER — ACETAMINOPHEN AND CODEINE PHOSPHATE 120; 12 MG/5ML; MG/5ML
SOLUTION ORAL
Qty: 28 TABLET | Refills: 11 | OUTPATIENT
Start: 2022-04-08

## 2022-05-31 ENCOUNTER — NURSE TRIAGE (OUTPATIENT)
Dept: OTHER | Facility: CLINIC | Age: 32
End: 2022-05-31

## 2022-05-31 NOTE — TELEPHONE ENCOUNTER
Received call from [facility] at [facility] with Red Flag Complaint. Subjective: Caller states \"sometimes I feel like I am about to fall over\"     Current Symptoms: dizziness - intermittent - episodes last \"a couple seconds\" - one episode per day    Onset: \"over a month\"    Denies - numbness / tingling / weakness on one side of the body / headache / double vision / blank spots in vision / black or tarry stool / heart palpitations        Pain Severity: 0/10     Temperature: Denies     What has been tried: drinking water    Pregnant: No    Recommended disposition: See PCP within 3 Days    Care advice provided, patient verbalizes understanding; denies any other questions or concerns; instructed to call back for any new or worsening symptoms. Patient/Caller agrees with recommended disposition; writer provided warm transfer to Christiano Alba at [facility] for appointment scheduling     Attention Provider: Thank you for allowing me to participate in the care of your patient. The patient was connected to triage in response to information provided to the ECC/PSC. Please do not respond through this encounter as the response is not directed to a shared pool.           Reason for Disposition   MILD dizziness (e.g., walking normally) AND has NOT been evaluated by physician for this (Exception: dizziness caused by heat exposure, sudden standing, or poor fluid intake)    Protocols used: DIZZINESS-ADULT-OH

## 2022-06-01 ENCOUNTER — OFFICE VISIT (OUTPATIENT)
Dept: FAMILY MEDICINE CLINIC | Age: 32
End: 2022-06-01
Payer: COMMERCIAL

## 2022-06-01 VITALS
HEART RATE: 94 BPM | TEMPERATURE: 96.9 F | OXYGEN SATURATION: 98 % | DIASTOLIC BLOOD PRESSURE: 68 MMHG | WEIGHT: 147 LBS | RESPIRATION RATE: 16 BRPM | BODY MASS INDEX: 29.69 KG/M2 | SYSTOLIC BLOOD PRESSURE: 108 MMHG

## 2022-06-01 DIAGNOSIS — D64.9 ANEMIA, UNSPECIFIED TYPE: ICD-10-CM

## 2022-06-01 DIAGNOSIS — H65.91 RIGHT SEROUS OTITIS MEDIA, UNSPECIFIED CHRONICITY: Primary | ICD-10-CM

## 2022-06-01 DIAGNOSIS — R42 DYSEQUILIBRIUM: ICD-10-CM

## 2022-06-01 LAB
BASOPHILS ABSOLUTE: 0.1 K/UL (ref 0–0.2)
BASOPHILS RELATIVE PERCENT: 1 %
EOSINOPHILS ABSOLUTE: 0.1 K/UL (ref 0–0.6)
EOSINOPHILS RELATIVE PERCENT: 1.9 %
HCT VFR BLD CALC: 36.7 % (ref 36–48)
HEMOGLOBIN: 12.1 G/DL (ref 12–16)
LYMPHOCYTES ABSOLUTE: 2.1 K/UL (ref 1–5.1)
LYMPHOCYTES RELATIVE PERCENT: 32.7 %
MCH RBC QN AUTO: 24.9 PG (ref 26–34)
MCHC RBC AUTO-ENTMCNC: 32.9 G/DL (ref 31–36)
MCV RBC AUTO: 75.5 FL (ref 80–100)
MONOCYTES ABSOLUTE: 0.5 K/UL (ref 0–1.3)
MONOCYTES RELATIVE PERCENT: 7 %
NEUTROPHILS ABSOLUTE: 3.7 K/UL (ref 1.7–7.7)
NEUTROPHILS RELATIVE PERCENT: 57.4 %
PDW BLD-RTO: 15 % (ref 12.4–15.4)
PLATELET # BLD: 301 K/UL (ref 135–450)
PMV BLD AUTO: 8 FL (ref 5–10.5)
RBC # BLD: 4.86 M/UL (ref 4–5.2)
TSH REFLEX: 0.32 UIU/ML (ref 0.27–4.2)
WBC # BLD: 6.5 K/UL (ref 4–11)

## 2022-06-01 PROCEDURE — 99214 OFFICE O/P EST MOD 30 MIN: CPT | Performed by: FAMILY MEDICINE

## 2022-06-01 ASSESSMENT — PATIENT HEALTH QUESTIONNAIRE - PHQ9
SUM OF ALL RESPONSES TO PHQ QUESTIONS 1-9: 2
SUM OF ALL RESPONSES TO PHQ9 QUESTIONS 1 & 2: 2
SUM OF ALL RESPONSES TO PHQ QUESTIONS 1-9: 2
SUM OF ALL RESPONSES TO PHQ QUESTIONS 1-9: 2
1. LITTLE INTEREST OR PLEASURE IN DOING THINGS: 0
2. FEELING DOWN, DEPRESSED OR HOPELESS: 2
SUM OF ALL RESPONSES TO PHQ QUESTIONS 1-9: 2

## 2022-06-01 ASSESSMENT — ENCOUNTER SYMPTOMS: SHORTNESS OF BREATH: 0

## 2022-06-01 NOTE — PROGRESS NOTES
Subjective:      Patient ID: Manohar Fields is a 34 y.o. y.o. female. Has \" dizzy \" feeling. Mostly since delivered her baby 4 months ago. Describes a mixed feeling of leaning to the side and like going to fall. able to correct and has not fallen    Some ringing in the right ear. No extremity sx / lateralizing sx. Not taking BCP currently  Is pumping and feeding breast milk, Baby will not latch. Breast feeding. HPI      Chief Complaint   Patient presents with    Dizziness     since she was pregnant, feels like leaning to one side, lightheaded - happening more frequently since son born in February       Allergies   Allergen Reactions    Pertussis Vaccines        Past Medical History:   Diagnosis Date    Asthma     asthmatic bronchitis treatment.   not dx asthma    Autism     Depression affecting pregnancy 12/6/2021    Generalized headaches        Past Surgical History:   Procedure Laterality Date    UPPER GASTROINTESTINAL ENDOSCOPY  04/13/2018    Esophagitis    WISDOM TOOTH EXTRACTION         Social History     Socioeconomic History    Marital status:      Spouse name: Not on file    Number of children: Not on file    Years of education: Not on file    Highest education level: Not on file   Occupational History    Not on file   Tobacco Use    Smoking status: Never Smoker    Smokeless tobacco: Never Used   Vaping Use    Vaping Use: Never used   Substance and Sexual Activity    Alcohol use: Not Currently     Alcohol/week: 0.0 standard drinks     Comment: occasionally    Drug use: No    Sexual activity: Yes     Partners: Female   Other Topics Concern    Not on file   Social History Narrative    Not on file     Social Determinants of Health     Financial Resource Strain: Low Risk     Difficulty of Paying Living Expenses: Not hard at all   Food Insecurity: No Food Insecurity    Worried About [address] in the Last Year: Never true    [address] in the Last Year: Never true   Transportation Needs: No Transportation Needs    Lack of Transportation (Medical): No    Lack of Transportation (Non-Medical): No   Physical Activity:     Days of Exercise per Week: Not on file    Minutes of Exercise per Session: Not on file   Stress:     Feeling of Stress : Not on file   Social Connections:     Frequency of Communication with Friends and Family: Not on file    Frequency of Social Gatherings with Friends and Family: Not on file    Attends Church Services: Not on file    Active Member of Clubs or Organizations: Not on file    Attends Club or Organization Meetings: Not on file    Marital Status: Not on file   Intimate Partner Violence:     Fear of Current or Ex-Partner: Not on file    Emotionally Abused: Not on file    Physically Abused: Not on file    Sexually Abused: Not on file   Housing Stability: Unknown    Unable to Pay for Housing in the Last Year: No    Number of [place] in the Last Year: Not on file    Unstable Housing in the Last Year: No       Family History   Problem Relation Age of Onset    Diabetes Mother     Diabetes Father     Heart Disease Maternal Grandmother     Cancer Maternal Grandfather         colon cancer    Heart Disease Maternal Grandfather        Vitals:    06/01/22 0835   BP: 108/68   Pulse: 94   Resp: 16   Temp: 96.9 °F (36.1 °C)   SpO2: 98%       Wt Readings from Last 3 Encounters:   06/01/22 147 lb (66.7 kg)   03/18/22 154 lb 9.6 oz (70.1 kg)   03/18/22 158 lb (71.7 kg)       Review of Systems   Constitutional: Negative for unexpected weight change. Respiratory: Negative for shortness of breath. Cardiovascular: Negative for chest pain and palpitations. Endocrine:        Feels a bit cooler than others   Neurological: Positive for light-headedness. Negative for facial asymmetry, numbness and headaches. Objective:   Physical Exam  Vitals reviewed. Constitutional:       Appearance: She is not ill-appearing.    HENT: Ears:      Comments: Serous right ear  Eyes:      Extraocular Movements: Extraocular movements intact. Pupils: Pupils are equal, round, and reactive to light. Comments: No nystagmus   Cardiovascular:      Rate and Rhythm: Normal rate and regular rhythm. Pulmonary:      Effort: Pulmonary effort is normal.      Breath sounds: Normal breath sounds. Musculoskeletal:         General: Normal range of motion. Right lower leg: No edema. Left lower leg: No edema. Lymphadenopathy:      Cervical: No cervical adenopathy. Neurological:      Mental Status: She is alert. Comments: Romberg neg  No arm drift  Neuro OK         Assessment:   Disequilibrium feeling  Postpartum state  Anemia during pregnancy  Serous otitis media, right          Plan: Will get cbc, tsh-  Was anemic during pregnancy, so will check    Saline NS for now. May add flonase    Current Outpatient Medications   Medication Sig Dispense Refill    norethindrone (ORTHO MICRONOR) 0.35 MG tablet Take 1 tablet by mouth daily 28 tablet 11    montelukast (SINGULAIR) 10 MG tablet TAKE 1 TABLET BY MOUTH EVERY DAY AT NIGHT 90 tablet 1    albuterol sulfate HFA (PROVENTIL HFA) 108 (90 Base) MCG/ACT inhaler Inhale 2 puffs into the lungs every 4 hours as needed for Wheezing 1 Inhaler 0     No current facility-administered medications for this visit.

## 2022-06-01 NOTE — PATIENT INSTRUCTIONS
Use saline nasal spray 2-3 times a day. Call in 2 days for the blood result and further instructions.

## 2022-06-03 ENCOUNTER — TELEPHONE (OUTPATIENT)
Dept: FAMILY MEDICINE CLINIC | Age: 32
End: 2022-06-03

## 2022-06-03 RX ORDER — FLUTICASONE PROPIONATE 50 MCG
2 SPRAY, SUSPENSION (ML) NASAL DAILY
Qty: 16 G | Refills: 3 | Status: SHIPPED | OUTPATIENT
Start: 2022-06-03 | End: 2022-06-06 | Stop reason: CLARIF

## 2022-06-03 NOTE — TELEPHONE ENCOUNTER
Spoke to the patient. Labs reviewed. Thyroid is okay. Hemoglobin hematocrit are much better from her pregnancy state. Recommended she take a multivitamin with iron. She is perhaps a little bit better with saline nasal spray. Will add Flonase. Instructed the patient and the use, expectations, and to continue the saline nasal spray also. She will follow-up as needed over the next couple of weeks.

## 2022-06-03 NOTE — TELEPHONE ENCOUNTER
----- Message from Zoila Gerard sent at 6/3/2022  2:06 PM EDT -----  Subject: Results Request    QUESTIONS  Which lab or imaging result is the patient calling about? Blood work  Which provider ordered the test? Melody Travis   At what location was the test performed? [address] [facility]  Date the test was performed? 2022-06-01  Additional Information for Provider?   ---------------------------------------------------------------------------  --------------  [address]  What is the best way for the office to contact you? OK to leave message on   voicemail  Preferred Call Back Phone Number? [phone]  ---------------------------------------------------------------------------  --------------  SCRIPT ANSWERS  Relationship to Patient?  Self

## 2022-06-06 ENCOUNTER — TELEPHONE (OUTPATIENT)
Dept: FAMILY MEDICINE CLINIC | Age: 32
End: 2022-06-06

## 2022-06-06 RX ORDER — AZELASTINE 1 MG/ML
1 SPRAY, METERED NASAL 2 TIMES DAILY
Qty: 30 ML | Refills: 3 | Status: SHIPPED | OUTPATIENT
Start: 2022-06-06 | End: 2022-06-29

## 2022-06-06 NOTE — TELEPHONE ENCOUNTER
We received a fax from [employer] stating pt's Hilaria Andres is not covered and asking for an alternative to be sent in please.

## 2022-06-29 RX ORDER — AZELASTINE HYDROCHLORIDE 137 UG/1
SPRAY, METERED NASAL
Qty: 1 EACH | Refills: 3 | Status: SHIPPED | OUTPATIENT
Start: 2022-06-29

## 2022-07-28 RX ORDER — SERTRALINE HYDROCHLORIDE 25 MG/1
25 TABLET, FILM COATED ORAL DAILY
Qty: 30 TABLET | Refills: 0 | Status: SHIPPED | OUTPATIENT
Start: 2022-07-28 | End: 2022-09-07 | Stop reason: ALTCHOICE

## 2022-09-07 ENCOUNTER — OFFICE VISIT (OUTPATIENT)
Dept: FAMILY MEDICINE CLINIC | Age: 32
End: 2022-09-07
Payer: COMMERCIAL

## 2022-09-07 VITALS
DIASTOLIC BLOOD PRESSURE: 62 MMHG | WEIGHT: 146 LBS | HEART RATE: 76 BPM | TEMPERATURE: 97.2 F | BODY MASS INDEX: 29.49 KG/M2 | RESPIRATION RATE: 16 BRPM | OXYGEN SATURATION: 98 % | SYSTOLIC BLOOD PRESSURE: 100 MMHG

## 2022-09-07 PROCEDURE — 99213 OFFICE O/P EST LOW 20 MIN: CPT | Performed by: FAMILY MEDICINE

## 2022-09-07 ASSESSMENT — PATIENT HEALTH QUESTIONNAIRE - PHQ9
1. LITTLE INTEREST OR PLEASURE IN DOING THINGS: 0
2. FEELING DOWN, DEPRESSED OR HOPELESS: 1
SUM OF ALL RESPONSES TO PHQ QUESTIONS 1-9: 1
SUM OF ALL RESPONSES TO PHQ9 QUESTIONS 1 & 2: 1
SUM OF ALL RESPONSES TO PHQ QUESTIONS 1-9: 1

## 2022-09-07 NOTE — PROGRESS NOTES
Subjective:      Patient ID: Nohemy Ortega is a 34 y.o. y.o. female. Here for mood/ depressed feeling. 7 months post partum    Frustrated about interactions with OB office and deliver. She was wanting a certain delivery and OB did not do that. Says wishes had more help at home, spouse. At a well baby check the \" pediatrician\" rec starting zoloft for post partum depression and rec she see me. Pt did not start the medication-  does not want to take meds. Denies SI or thoughts of harming the baby. Trying to nurse, but baby won't latch. Pt pumping and bottle feeding. Says she has anxiety and feels overwhelmed. HPI      Chief Complaint   Patient presents with    Medication Check     Had not been taking Zoloft - sons pediatrician recommended she had the appointment       Allergies   Allergen Reactions    Pertussis Vaccines        Past Medical History:   Diagnosis Date    Asthma     asthmatic bronchitis treatment.   not dx asthma    Autism     Depression affecting pregnancy 12/6/2021    Generalized headaches        Past Surgical History:   Procedure Laterality Date    UPPER GASTROINTESTINAL ENDOSCOPY  04/13/2018    Esophagitis    WISDOM TOOTH EXTRACTION         Social History     Socioeconomic History    Marital status:      Spouse name: Not on file    Number of children: Not on file    Years of education: Not on file    Highest education level: Not on file   Occupational History    Not on file   Tobacco Use    Smoking status: Never    Smokeless tobacco: Never   Vaping Use    Vaping Use: Never used   Substance and Sexual Activity    Alcohol use: Not Currently     Alcohol/week: 0.0 standard drinks     Comment: occasionally    Drug use: No    Sexual activity: Yes     Partners: Female   Other Topics Concern    Not on file   Social History Narrative    Not on file     Social Determinants of Health     Financial Resource Strain: Low Risk     Difficulty of Paying Living Expenses: Not hard at all Food Insecurity: No Food Insecurity    Worried About Running Out of Food in the Last Year: Never true    Ran Out of Food in the Last Year: Never true   Transportation Needs: No Transportation Needs    Lack of Transportation (Medical): No    Lack of Transportation (Non-Medical): No   Physical Activity: Not on file   Stress: Not on file   Social Connections: Not on file   Intimate Partner Violence: Not on file   Housing Stability: Unknown    Unable to Pay for Housing in the Last Year: No    Number of Places Lived in the Last Year: Not on file    Unstable Housing in the Last Year: No       Family History   Problem Relation Age of Onset    Diabetes Mother     Diabetes Father     Heart Disease Maternal Grandmother     Cancer Maternal Grandfather         colon cancer    Heart Disease Maternal Grandfather        Vitals:    09/07/22 0751   BP: 100/62   Pulse: 76   Resp: 16   Temp: 97.2 °F (36.2 °C)   SpO2: 98%       Wt Readings from Last 3 Encounters:   09/07/22 146 lb (66.2 kg)   06/01/22 147 lb (66.7 kg)   03/18/22 154 lb 9.6 oz (70.1 kg)       Review of Systems   HENT:          Previous ear sx and like balance off is resolved   Cardiovascular:  Negative for chest pain and palpitations. Neurological:         Occ headache- frontal-  1-2 per month,  Ibuprofen helps. Psychiatric/Behavioral:          OB rec a counselor,  she called and no rapport. Is willing to try another. Objective:   Physical Exam  Constitutional:       Appearance: She is not ill-appearing. Pulmonary:      Effort: Pulmonary effort is normal.   Neurological:      Mental Status: She is alert. Psychiatric:      Comments: Edgy and somewhat anxious / frustrated sounding. Speech clear. No thought disorder. Says was / is looking for possible support group for new mothers. Has found one in [facility] and wishes that was available during preg.          Assessment:      Post partum depression        Plan:     Believe the patient is not a risk for harm to herself or child  Refer to counseling. Will explore support  group. Agrees to call if mood worsens or any bad thoughts    See us one month    Current Outpatient Medications   Medication Sig Dispense Refill    Azelastine HCl 137 MCG/SPRAY SOLN USE 1 SPRAY BY NASAL ROUTE 2 TIMES DAILY USE IN EACH NOSTRIL AS DIRECTED 1 each 3    montelukast (SINGULAIR) 10 MG tablet TAKE 1 TABLET BY MOUTH EVERY DAY AT NIGHT 90 tablet 1    albuterol sulfate HFA (PROVENTIL HFA) 108 (90 Base) MCG/ACT inhaler Inhale 2 puffs into the lungs every 4 hours as needed for Wheezing 1 Inhaler 0     No current facility-administered medications for this visit.

## 2022-09-16 NOTE — PROGRESS NOTES
Behavioral Health Consultation  [address]  9/20/2022   11:15 AM EDT      Tonia Hughs, was evaluated through a synchronous (real-time) audio-video encounter. The patient (or guardian if applicable) is aware that this is a billable service, which includes applicable co-pays. This Virtual Visit was conducted with patient's (and/or legal guardian's) consent. Verbal consent to proceed has been obtained within the past 12 months. The visit was conducted pursuant to the emergency declaration under the [facility], [facility] authority and the [place] Act. Patient identification was verified, and a caregiver was present when appropriate. The patient was located in a state where the provider was licensed to provide care. Time spent with Patient: 40 minutes  This is patient's first [facility] appointment. Reason for Consult:    Chief Complaint   Patient presents with    Anxiety     Referring Provider: Kendall Robledo DO  [place] [facility],  [address] [place] [address]    Patient provided informed consent for the behavioral health program. Discussed with patient model of service to include the limits of confidentiality (i.e. abuse reporting, suicide intervention, etc.) and short-term intervention focused approach. Pt indicated understanding. Feedback given to PCP.     Verified the following information:  Patient's identification: Yes  Patient location: [address] [place] [address] [facility]  Patient's call back number: [phone]   Patient's emergency contact's name and number, as well as permission to contact them if needed: Extended Emergency Contact Information  Primary Emergency Contact: Ralph Cabello   [address] Phone: [phone]  Relation: Other  Secondary Emergency Contact: Faustino [address] [place] [address] Phone: [phone]  Relation: Other     Provider location: [address]:  Patient presents with concerns about anxiety. Pt reports anxiety interfered with work during her pregnancy. Patient reports symptoms of anxiety, including worry about getting pregnant again and not getting appropriate healthcare support in the future. Pt has been in therapy before with the counselor at the [facility] office, but reports it wasn't helpful and wasn't a good experience. Pt felt \"abandoned\" and \"unsupported\" by her OB during her pregnancy. Pt reports that she wants to file a compliant against the office. Pt reports a similar experience with her son's pediatrician. Pt states that she \"wants answers for what they did. \" Pt states that she had two miscarriages in the past, and was not supported at that time either. Pt states that she left that OB office after the experience. Pt reports that she has not felt supported in her breast feeding journey, and also feels that her  should have been given more guidance on how to support her with breastfeeding. Pt reports having limited financial resources. Goals for treatment include resources for support groups. Patient lives with  and 11 month old son. Patient describes no social support. Per note from Dr. Kishore Walker on 9/7/22:  Here for mood/ depressed feeling. 7 months post partum     Frustrated about interactions with OB office and deliver. She was wanting a certain delivery and OB did not do that. Says wishes had more help at home, spouse. At a well baby check the \" pediatrician\" rec starting zoloft for post partum depression and rec she see me. Pt did not start the medication-  does not want to take meds. Denies SI or thoughts of harming the baby. Trying to nurse, but baby won't latch. Pt pumping and bottle feeding. Says she has anxiety and feels overwhelmed.     O:  MSE:    Appearance: good hygiene   Attitude: cooperative and friendly  Consciousness: alert  Orientation: oriented to person, place, Insecurity: No Food Insecurity    Worried About Running Out of Food in the Last Year: Never true    Ran Out of Food in the Last Year: Never true   Transportation Needs: No Transportation Needs    Lack of Transportation (Medical): No    Lack of Transportation (Non-Medical): No   Physical Activity: Not on file   Stress: Not on file   Social Connections: Not on file   Intimate Partner Violence: Not on file   Housing Stability: Unknown    Unable to Pay for Housing in the Last Year: No    Number of Places Lived in the Last Year: Not on file    Unstable Housing in the Last Year: No     TOBACCO:   reports that she has never smoked. She has never used smokeless tobacco.  ETOH:   reports that she does not currently use alcohol. Family History:   Family History   Problem Relation Age of Onset    Diabetes Mother     Diabetes Father     Heart Disease Maternal Grandmother     Cancer Maternal Grandfather         colon cancer    Heart Disease Maternal Grandfather          A:  Patient endorses stable mood. Denies SI/HI, with good insight and motivation. Diagnosis:    1. Anxiety          Past Diagnosis:        Diagnosis Date    Asthma     asthmatic bronchitis treatment.   not dx asthma    Autism     Depression affecting pregnancy 12/6/2021    Generalized headaches            Plan:  Pt interventions:  Established rapport  Conducted functional assessment  [place]-setting to identify pt's primary goals for [facility] visit / overall health  Collaborative treatment planning,Clarified role of [facility] in primary care,Recommended that pt establish with a mental health clinician with whom they can meet regularly for psychotherapy services  Provided community referrals    Pt Behavioral Change Plan:   See Pt Instructions

## 2022-09-20 ENCOUNTER — TELEMEDICINE (OUTPATIENT)
Dept: PSYCHOLOGY | Age: 32
End: 2022-09-20

## 2022-09-20 DIAGNOSIS — F41.9 ANXIETY: Primary | ICD-10-CM

## 2022-09-20 PROCEDURE — 90791 PSYCH DIAGNOSTIC EVALUATION: CPT | Performed by: SOCIAL WORKER

## 2022-10-19 ENCOUNTER — OFFICE VISIT (OUTPATIENT)
Dept: FAMILY MEDICINE CLINIC | Age: 32
End: 2022-10-19
Payer: COMMERCIAL

## 2022-10-19 VITALS
HEART RATE: 91 BPM | OXYGEN SATURATION: 98 % | RESPIRATION RATE: 18 BRPM | BODY MASS INDEX: 29.89 KG/M2 | DIASTOLIC BLOOD PRESSURE: 76 MMHG | WEIGHT: 148 LBS | SYSTOLIC BLOOD PRESSURE: 122 MMHG

## 2022-10-19 DIAGNOSIS — F41.9 ANXIETY AND DEPRESSION: Primary | ICD-10-CM

## 2022-10-19 DIAGNOSIS — R10.2 VAGINAL PAIN: ICD-10-CM

## 2022-10-19 DIAGNOSIS — Z63.8 STRESS DUE TO FAMILY TENSION: ICD-10-CM

## 2022-10-19 DIAGNOSIS — F32.A ANXIETY AND DEPRESSION: Primary | ICD-10-CM

## 2022-10-19 DIAGNOSIS — G47.8 POOR SLEEP PATTERN: ICD-10-CM

## 2022-10-19 PROCEDURE — 99215 OFFICE O/P EST HI 40 MIN: CPT | Performed by: NURSE PRACTITIONER

## 2022-10-19 SDOH — SOCIAL STABILITY - SOCIAL INSECURITY: OTHER SPECIFIED PROBLEMS RELATED TO PRIMARY SUPPORT GROUP: Z63.8

## 2022-10-19 ASSESSMENT — ENCOUNTER SYMPTOMS
GASTROINTESTINAL NEGATIVE: 1
RESPIRATORY NEGATIVE: 1

## 2022-10-19 NOTE — PROGRESS NOTES
10/19/2022    This is a 34 y.o. female   Chief Complaint   Patient presents with    Depression     1 month f/u. Postpartum depression. Pt states she saw Christi Bowie. Pt unsure how she is doing   . Solomon Guevara is seen today to follow-up on anxiety and depression. She saw Dr. Markie Burgess a little over a month ago who recommended therapy. She had 1 visit with Leanne Otto who she states she felt she connected to. She states that she \"understood the situations she was dealing with. However she is not had other appointments with her since then. She continues to express anxiety and frustration over the situations that occurred while she was pregnant. She again states that she \"felt abandoned and that did not get the help that she needed\". She also states that she has not had a resolution to this situation and this lack of resolution is making her anxiety worse. She states that she does not feel that she can be a advocate for herself or her son because her \"feelings were dismissed\". When asked about her support system she states that she does not \"have anybody\". Her  is not as helpful as she would like him to be. She has asked them for help with caring for their son or with housework and basically takes the child for 10 minutes and then gives them back to her stating \"he wants his mom\". She has asked him for additional help around the house but again most of the household chores fall to her. When asked about couples counseling she states that he will not go even though she has asked him. She also states that he \"stopped listening to me when I was pregnant\". She denies any moe-based support or family support. She does not feel that she has the relationship to talk with her family regarding her anxieties and her stress. When asked about her coping strategies she was not able to identify any.   When asked if there was an activity that she used to like to do she identified working in the barn with horses when she was by herself. We discussed guided imagery as this being her area of focus for her relaxation. This was demonstrated and information was provided to the patient. We also discussed mindfulness and multitasking. Information on mindfulness was printed for the patient. She is scheduled to follow-up with Michelle. When asked about her sleep she states that she gets very little sleep but she is \"sleep deprived. Her son will go to bed around 9:00. At that point she then works on cleaning the house and taking care of the things that did not get done during the day. She is still also actively pumping and she will pump every 2-3 hours even through the night. \"  We discussed that it 8 months he is not likely feeding every 2-3 hours as he was when he was informed, recommended that she space out her pumping at night to allow her to get more sleep. Sleep deprivation and exhaustion can make her anxiety and her symptoms worse. She agreed to consider. Patient Active Problem List   Diagnosis    Dyspepsia    RUQ pain    Prenatal care in third trimester    Anxiety during pregnancy    Anxiety about health    Round ligament pain    Depression affecting pregnancy    Back pain in pregnancy    Nausea and vomiting in pregnancy    Non-reassuring electronic fetal monitoring tracing    Normal labor     (normal spontaneous vaginal delivery)    Postpartum care and examination immediately after delivery       Current Outpatient Medications   Medication Sig Dispense Refill    Azelastine HCl 137 MCG/SPRAY SOLN USE 1 SPRAY BY NASAL ROUTE 2 TIMES DAILY USE IN EACH NOSTRIL AS DIRECTED 1 each 3    montelukast (SINGULAIR) 10 MG tablet TAKE 1 TABLET BY MOUTH EVERY DAY AT NIGHT 90 tablet 1    albuterol sulfate HFA (PROVENTIL HFA) 108 (90 Base) MCG/ACT inhaler Inhale 2 puffs into the lungs every 4 hours as needed for Wheezing 1 Inhaler 0     No current facility-administered medications for this visit.        Allergies   Allergen Reactions    Pertussis Vaccines        There were no vitals taken for this visit. Social History     Tobacco Use    Smoking status: Never    Smokeless tobacco: Never   Substance Use Topics    Alcohol use: Not Currently     Alcohol/week: 0.0 standard drinks     Comment: occasionally       Review of Systems   Constitutional:  Positive for fatigue. Respiratory: Negative. Cardiovascular: Negative. Gastrointestinal: Negative. Genitourinary:  Positive for vaginal pain (Ever since delivery with third-degree laceration repair). Neurological: Negative. Psychiatric/Behavioral:  Positive for dysphoric mood and sleep disturbance. Negative for behavioral problems, self-injury and suicidal ideas. The patient is nervous/anxious. Physical Exam  Constitutional:       Appearance: Normal appearance. She is obese. She is not ill-appearing. HENT:      Head: Normocephalic and atraumatic. Right Ear: External ear normal.      Left Ear: External ear normal.      Nose: Nose normal. No rhinorrhea. Mouth/Throat:      Pharynx: Oropharynx is clear. Eyes:      General:         Right eye: No discharge. Left eye: No discharge. Conjunctiva/sclera: Conjunctivae normal.   Neck:      Trachea: Phonation normal.   Pulmonary:      Effort: Pulmonary effort is normal. No respiratory distress. Musculoskeletal:      Cervical back: Normal range of motion. Skin:     Coloration: Skin is not jaundiced or pale. Neurological:      General: No focal deficit present. Mental Status: She is alert and oriented to person, place, and time. Psychiatric:         Attention and Perception: Attention and perception normal.         Mood and Affect: Mood is depressed. Affect is tearful. Speech: Speech normal.         Behavior: Behavior normal.         Thought Content: Thought content normal. Thought content is not paranoid. Thought content does not include homicidal or suicidal ideation.          Judgment: Judgment normal.       Diagnosis       ICD-10-CM    1. Anxiety and depression  F41.9     F32. A       2. Poor sleep pattern  G47.8       3. Vaginal pain  R10.2       4. Stress due to family tension  Z63.8            Plan    Some recommendations as above with guided imagery, mindfulness, continue counseling and trying to improve her sleep  Patient does not wish medications at this time, however discussed the benefit of medications as a tool to help with anxiety and depression  Discussed thoughts lead to feelings and how we respond can be changed by changes in behavior and how we think about things  Recommended discussing vaginal pain with new gynecologist  Discussed how you cannot change past events but you can change your thinking about past events  Is to develop coping strategies and needs to identify/establish a support system  No orders of the defined types were placed in this encounter. No orders of the defined types were placed in this encounter. Patient Education:  Mindfulness, guided imagery    Return in about 4 weeks (around 11/16/2022) for Mental health.       Than 50 minutes was spent in face-to-face conversation with the patient

## 2022-10-20 NOTE — PROGRESS NOTES
Behavioral Health Consultation- Follow UP  [address]  10/24/2022   10:38 AM      Romario Howell, was evaluated through a synchronous (real-time) audio-video encounter. The patient (or guardian if applicable) is aware that this is a billable service, which includes applicable co-pays. This Virtual Visit was conducted with patient's (and/or legal guardian's) consent. Verbal consent to proceed has been obtained within the past 12 months. The visit was conducted pursuant to the emergency declaration under the [facility], [facility] authority and the [employer] General Act. Patient identification was verified, and a caregiver was present when appropriate. The patient was located in a state where the provider was licensed to provide care. Time spent with Patient: 30 minutes  This is patient's second  [facility] appointment. Reason for Consult:    Chief Complaint   Patient presents with    Anxiety    Depression     Referring Provider: Haley Bautista DO  [address] [place] [address] 83,  [facility]    Patient provided informed consent for the behavioral health program. Discussed with patient model of service to include the limits of confidentiality (i.e. abuse reporting, suicide intervention, etc.) and short-term intervention focused approach. Pt indicated understanding. Feedback given to PCP.     Verified the following information:  Patient's identification: Yes  Patient location: [address] [place] [address] Dr Sandra Winn [id number]   Patient's call back number: [phone]   Patient's emergency contact's name and number, as well as permission to contact them if needed: Extended Emergency Contact Information  Primary Emergency Contact: Ralph Cabello   [address] Phone: [phone]  Relation: Other  Secondary Emergency Contact: Faustino [address] [place] [address] Phone: [phone]  Relation: Other     Provider location: Emily Houser:  Last appointment 9/20/22    Pt has been feeling depressed. Pt states that she spoke to the  at the ob/gyn office, and was informed that there is nothing more that they can do to resolve her issue. Pt states that she is no longer able to contact anyone from the office. Pt states that without the closure, she does not feel that she can move forward. Pt continues to feel frustrated, angry and invalidated in her experience. Pt reports that throughout her pregnancy and delivery, she had no support, and wanted her doctor's office to provide support medically, emotionally and by providing resources. Pt states that she has been attending lactation meetings with her son, however, has not been able to attend recently due to sickness.      O:  MSE:    Appearance: adequate hygiene  Attitude: cooperative and friendly  Consciousness: alert  Orientation: oriented to person, place, time, general circumstance  Memory    recent and remote memory intact  Attention/Concentration    intact  Psychomotor Activity:normal  Eye Contact: normal  Speech: normal rate and volume, well-articulated  Mood    angry  Affect    congruent  Perception: within normal limits  Thought Content: within normal limits  Thought Process: logical, coherent and goal-directed  Associations    logical connections  Insight: good  Judgment    Intact  Appetite normal  Sleep disturbance Yes  Fatigue Yes  Loss of pleasure No  Impulsive behavior Yes  Morbid Ideation: no   Suicide Assessment: no suicidal ideation, plan, or intent  Homicidal Ideation: no    History:    Medications:   Current Outpatient Medications   Medication Sig Dispense Refill    Azelastine HCl 137 MCG/SPRAY SOLN USE 1 SPRAY BY NASAL ROUTE 2 TIMES DAILY USE IN EACH NOSTRIL AS DIRECTED 1 each 3    montelukast (SINGULAIR) 10 MG tablet TAKE 1 TABLET BY MOUTH EVERY DAY AT NIGHT 90 tablet 1    albuterol sulfate HFA (PROVENTIL HFA) 108 (90 Base) MCG/ACT inhaler Inhale 2 puffs into the lungs every 4 hours as needed for Wheezing 1 Inhaler 0     No current facility-administered medications for this visit. Social History:   Social History     Socioeconomic History    Marital status:      Spouse name: Not on file    Number of children: Not on file    Years of education: Not on file    Highest education level: Not on file   Occupational History    Not on file   Tobacco Use    Smoking status: Never    Smokeless tobacco: Never   Vaping Use    Vaping Use: Never used   Substance and Sexual Activity    Alcohol use: Not Currently     Alcohol/week: 0.0 standard drinks     Comment: occasionally    Drug use: No    Sexual activity: Yes     Partners: Female   Other Topics Concern    Not on file   Social History Narrative    Not on file     Social Determinants of Health     Financial Resource Strain: Low Risk     Difficulty of Paying Living Expenses: Not hard at all   Food Insecurity: No Food Insecurity    Worried About [address] [employer] in the Last Year: Never true    [address] [employer] in the Last Year: Never true   Transportation Needs: No Transportation Needs    Lack of Transportation (Medical): No    Lack of Transportation (Non-Medical): No   Physical Activity: Not on file   Stress: Not on file   Social Connections: Not on file   Intimate Partner Violence: Not on file   Housing Stability: Unknown    Unable to Pay for Housing in the Last Year: No    Number of Places Lived in the Last Year: Not on file    Unstable Housing in the Last Year: No     TOBACCO:   reports that she has never smoked. She has never used smokeless tobacco.  ETOH:   reports that she does not currently use alcohol. Family History:   Family History   Problem Relation Age of Onset    Diabetes Mother     Diabetes Father     Heart Disease Maternal Grandmother     Cancer Maternal Grandfather         colon cancer    Heart Disease Maternal Grandfather          A:  Patient endorses stable mood.  Denies SI/HI, with good insight and motivation. Diagnosis:    1. Anxiety    2. MDD (major depressive disorder), recurrent episode, moderate (HCC)          Past Diagnosis:        Diagnosis Date    Asthma     asthmatic bronchitis treatment.   not dx asthma    Autism     Depression affecting pregnancy 12/6/2021    Generalized headaches          Plan:  Patient interventions:  Trained in strategies for increasing balanced thinking  Supportive techniques  Validated Pt emotions    Patient Behavioral Change Plan:   See Patient Instructions

## 2022-10-24 ENCOUNTER — TELEMEDICINE (OUTPATIENT)
Dept: PSYCHOLOGY | Age: 32
End: 2022-10-24
Payer: COMMERCIAL

## 2022-10-24 DIAGNOSIS — F33.1 MDD (MAJOR DEPRESSIVE DISORDER), RECURRENT EPISODE, MODERATE (HCC): ICD-10-CM

## 2022-10-24 DIAGNOSIS — F41.9 ANXIETY: Primary | ICD-10-CM

## 2022-10-24 PROCEDURE — 90832 PSYTX W PT 30 MINUTES: CPT | Performed by: SOCIAL WORKER

## 2022-10-30 ENCOUNTER — PATIENT MESSAGE (OUTPATIENT)
Dept: FAMILY MEDICINE CLINIC | Age: 32
End: 2022-10-30

## 2022-10-31 ENCOUNTER — TELEPHONE (OUTPATIENT)
Dept: FAMILY MEDICINE CLINIC | Age: 32
End: 2022-10-31

## 2022-10-31 NOTE — TELEPHONE ENCOUNTER
From: Michele Polanco  To: Milton Van  Sent: 10/30/2022 7:16 PM EDT  Subject:     I have a question my  jolie past out again today.  I am starting to worry him

## 2022-11-09 ENCOUNTER — PATIENT MESSAGE (OUTPATIENT)
Dept: FAMILY MEDICINE CLINIC | Age: 32
End: 2022-11-09

## 2022-11-10 NOTE — TELEPHONE ENCOUNTER
From: Cheli Marshall  Sent: 11/10/2022 9:40 AM EST  To: [facility]  Subject: Call    Anytime is okay

## 2022-11-10 NOTE — TELEPHONE ENCOUNTER
Called patient to see what we can do for her. States she tried to call our office yesterday and states the number kept disconnecting and was not able to get a call through. Pt states she keeps trying to get a hold of her OBGYN office with Dr Jolene Reid. States she called the office last week and yesterday and was told they can not talk to her and would send a record release form, but she has never received one. I told pt I will send her a record release form through [employer] as well as E-mail as the form is able to be used for any [facility] office. Pt will call or send a [employer] message if she does not receive the form.

## 2022-11-14 ENCOUNTER — TELEMEDICINE (OUTPATIENT)
Dept: PSYCHOLOGY | Age: 32
End: 2022-11-14
Payer: COMMERCIAL

## 2022-11-14 DIAGNOSIS — F41.9 ANXIETY: Primary | ICD-10-CM

## 2022-11-14 PROCEDURE — 90832 PSYTX W PT 30 MINUTES: CPT | Performed by: SOCIAL WORKER

## 2022-11-14 NOTE — PROGRESS NOTES
Behavioral Health Consultation- Follow UP  JUNE Mabry  11/14/2022   2:20 PM      Katherine Paula, was evaluated through a synchronous (real-time) audio-video encounter. The patient (or guardian if applicable) is aware that this is a billable service, which includes applicable co-pays. This Virtual Visit was conducted with patient's (and/or legal guardian's) consent. Verbal consent to proceed has been obtained within the past 12 months. The visit was conducted pursuant to the emergency declaration under the [facility], [facility] authority and the [employer] General Act. Patient identification was verified, and a caregiver was present when appropriate. The patient was located in a state where the provider was licensed to provide care. Time spent with Patient: 30 minutes  This is patient's third  [facility] appointment. Reason for Consult:    Chief Complaint   Patient presents with    Other     anger     Referring Provider: Regis Simpson DO  [address],  [address] [place] [address]    Patient provided informed consent for the behavioral health program. Discussed with patient model of service to include the limits of confidentiality (i.e. abuse reporting, suicide intervention, etc.) and short-term intervention focused approach. Pt indicated understanding. Feedback given to PCP.     Verified the following information:  Patient's identification: Yes  Patient location: [address] [place] [address] Dr Leda Levy [id number]   Patient's call back number: [phone]   Patient's emergency contact's name and number, as well as permission to contact them if needed: Extended Emergency Contact Information  Primary Emergency Contact: Ralph Cabello   [address] Phone: [phone]  Relation: Other  Secondary Emergency Contact: Faustino [address] [place] [address] Phone: [phone]  Relation: Other     Provider location: Sue, [address] [place] [address] St:  Last appointment 10/24/22    Pt states that she requested her records released from her ob/gyn. Pt states that they stated they would send her a release form, but she did not receive it. Pt states they are withholding her records and is feeling very angry. She wants to take legal action as she believes that will provide the only resolution to her anger. Discussed anger management techniques. Pt states that techniques will not be effective and she does not feel that anything will help, except legal retribution. Pt states that she has spoken to a couple of attorneys but has not had success in getting someone to take her case. Pt states that she will continue her search. Pt states that she thinks about it most of the day. Pt states that she received a medical record release form from her PCP, and she will submit it for a copy of her records. Pt states that she reviewed her records in [place], and states that she was given fentanyl against her will during her delivery.      O:  MSE:    Appearance: adequate hygiene  Attitude: cooperative and friendly  Consciousness: alert  Orientation: oriented to person, place, time, general circumstance  Memory    recent and remote memory intact  Attention/Concentration    intact  Psychomotor Activity:normal  Eye Contact: normal  Speech: normal rate and volume, well-articulated  Mood    Angry  Affect    agitation  Perception: within normal limits  Thought Content: within normal limits  Thought Process: logical, coherent and goal-directed  Associations    logical connections  Insight: good  Judgment    Intact  Appetite normal  Sleep disturbance No  Fatigue No  Loss of pleasure No  Impulsive behavior Yes  Morbid Ideation: no   Suicide Assessment: no suicidal ideation, plan, or intent  Homicidal Ideation: no    History:    Medications:   Current Outpatient Medications   Medication Sig Dispense Refill    Azelastine HCl 137 MCG/SPRAY SOLN USE 1 SPRAY BY NASAL ROUTE 2 TIMES DAILY USE IN EACH NOSTRIL AS DIRECTED 1 each 3    montelukast (SINGULAIR) 10 MG tablet TAKE 1 TABLET BY MOUTH EVERY DAY AT NIGHT 90 tablet 1    albuterol sulfate HFA (PROVENTIL HFA) 108 (90 Base) MCG/ACT inhaler Inhale 2 puffs into the lungs every 4 hours as needed for Wheezing 1 Inhaler 0     No current facility-administered medications for this visit. Social History:   Social History     Socioeconomic History    Marital status:      Spouse name: Not on file    Number of children: Not on file    Years of education: Not on file    Highest education level: Not on file   Occupational History    Not on file   Tobacco Use    Smoking status: Never    Smokeless tobacco: Never   Vaping Use    Vaping Use: Never used   Substance and Sexual Activity    Alcohol use: Not Currently     Alcohol/week: 0.0 standard drinks     Comment: occasionally    Drug use: No    Sexual activity: Yes     Partners: Female   Other Topics Concern    Not on file   Social History Narrative    Not on file     Social Determinants of Health     Financial Resource Strain: Low Risk     Difficulty of Paying Living Expenses: Not hard at all   Food Insecurity: No Food Insecurity    Worried About [address] in the Last Year: Never true    [address] in the Last Year: Never true   Transportation Needs: No Transportation Needs    Lack of Transportation (Medical): No    Lack of Transportation (Non-Medical): No   Physical Activity: Not on file   Stress: Not on file   Social Connections: Not on file   Intimate Partner Violence: Not on file   Housing Stability: Unknown    Unable to Pay for Housing in the Last Year: No    Number of Places Lived in the Last Year: Not on file    Unstable Housing in the Last Year: No     TOBACCO:   reports that she has never smoked. She has never used smokeless tobacco.  ETOH:   reports that she does not currently use alcohol.   Family History:   Family History   Problem Relation Age of Onset    Diabetes Mother     Diabetes Father     Heart Disease Maternal Grandmother     Cancer Maternal Grandfather         colon cancer    Heart Disease Maternal Grandfather          A:  Patient endorses stable mood. Denies SI/HI, with good insight and motivation. Diagnosis:    1. Anxiety          Past Diagnosis:        Diagnosis Date    Asthma     asthmatic bronchitis treatment.   not dx asthma    Autism     Depression affecting pregnancy 12/6/2021    Generalized headaches          Plan:  Patient interventions:  Provided handout on  mindfulness  Provided Psychoeducation re: mindfulness  Supportive techniques  Offered anger management techniques    Patient Behavioral Change Plan:   See Patient Instructions

## 2022-11-14 NOTE — PATIENT INSTRUCTIONS
Review handout on mindfulness. Practice once/day. MINDFULNESS    Mindfulness means paying attention in a particular way: on purpose, in the present moment, and non-judgmentally. Ajit Shepard    \"Mindfulness is the basic human ability to be fully present, aware of where we are and what were doing, and not overly reactive or overwhelmed by whats going on around us. \"   -Mindful [place]    Paying attention on purpose  Mindfulness involves paying attention on purpose. Mindfulness involves a conscious direction of our awareness. This can mean purposefully directing our attention to the breath, or a particular emotion, or an activity as simple as eating. Doing so allows us to actively shape the mind. Paying attention in the present moment  Left to itself, the mind wanders through all kinds of thoughts -- including thoughts expressing anger, craving, depression, self-pity, and anxiety. As we indulge in these kinds of thoughts, we reinforce those emotions and cause ourselves to suffer. These thoughts usually center around the past or future. But the past no longer exists. The future is just a fantasy until it happens. The one moment we actually can experience -- the present moment -- is the one we seem most to avoid. By purposefully directing our awareness away from thoughts about the past or future and instead towards the [address] - our present moment experience - we decrease the effect of these thoughts on our lives. Paying attention non-judgmentally  Mindfulness is an emotionally non-reactive state. We don't  that this experience is good and that one is bad. Or, if we do make those judgments, we dont get upset in reaction to our experience. We simply notice it arising, observe it mindfully, and allow it to pass through us. When practicing mindfulness, we may be aware that certain experiences are pleasant and some are unpleasant, but on an emotional level we dont react. Resources  \"Wherever You Go, There You Are: Mindfulness Meditation in Everyday Life\" - by Hina Douglass of Mindfulness:  An Introduction to the Practice of Meditation\" - by Lino Lantigua  \"The Mindfulness Solution: Everyday Practices for Everyday Problems\" - by Melissa Caal    Ways to Practice Mindfulness  Pay attention to your breathing  Take a mindful walk  Eat mindfully  Ground yourself in your five senses  Journal  Try dishwashing, cleaning and doing laundry a little bit slower than you usually do  Meditate

## 2022-12-12 ENCOUNTER — TELEMEDICINE (OUTPATIENT)
Dept: PSYCHOLOGY | Age: 32
End: 2022-12-12
Payer: COMMERCIAL

## 2022-12-12 DIAGNOSIS — F41.9 ANXIETY: Primary | ICD-10-CM

## 2022-12-12 PROCEDURE — 90832 PSYTX W PT 30 MINUTES: CPT | Performed by: SOCIAL WORKER

## 2022-12-12 NOTE — PROGRESS NOTES
Behavioral Health Consultation- Follow UP  Allyson Gutierrez [address]  12/12/2022   10:16 AM      Michelle Lamp, was evaluated through a synchronous (real-time) audio-video encounter. The patient (or guardian if applicable) is aware that this is a billable service, which includes applicable co-pays. This Virtual Visit was conducted with patient's (and/or legal guardian's) consent. Verbal consent to proceed has been obtained within the past 12 months. The visit was conducted pursuant to the emergency declaration under the [facility], [facility] [place] authority and the [employer] Act. Patient identification was verified, and a caregiver was present when appropriate. The patient was located in a state where the provider was licensed to provide care. Time spent with Patient: 30 minutes  This is patient's fourth  [facility] appointment. Reason for Consult:    Chief Complaint   Patient presents with    Anxiety     Referring Provider: Gregorio Carcamo DO  [address],  [facility]    Patient provided informed consent for the behavioral health program. Discussed with patient model of service to include the limits of confidentiality (i.e. abuse reporting, suicide intervention, etc.) and short-term intervention focused approach. Pt indicated understanding. Feedback given to PCP.     Verified the following information:  Patient's identification: Yes  Patient location: [address] [place] [address] Dr Reid Teran [id number]   Patient's call back number: [phone]   Patient's emergency contact's name and number, as well as permission to contact them if needed: Extended Emergency Contact Information  Primary Emergency Contact: [address] [place] [address] Phone: [phone]  Relation: Other  Secondary Emergency Contact: [address] Phone: [phone]  Relation: Other     Provider location: [place], [place] S:  Last appointment 11/14/22    Pt reports that her baby is sick, so she has not slept well. Pt states that they have an appointment with his doctor tomorrow. Pt states that she has an appointment with a new OB next week. Pt is hoping that she will be able to \"get some answers\" as she has had pain in her stitches since giving birth. Pt asked to be referred to a pelvic  when she saw another OB, but was informed there wasn't a medical necessity. Pt plans to ask for another referral.    Pt has filed a complaint with the medical board regarding her previous issues with healthcare providers. Pt states that she was informed that the case was closed, and she asked to have it re-opened. Pt continues to have a lot of anger around feeling unsupported with her previous care. Pt states that despite obstacles, she will continue to \"fight. \"     O:  MSE:    Appearance: good hygiene   Attitude: cooperative and friendly  Consciousness: alert  Orientation: oriented to person, place, time, general circumstance  Memory    recent and remote memory intact  Attention/Concentration    intact  Psychomotor Activity:normal  Eye Contact: normal  Speech: normal rate and volume, well-articulated  Mood    Anxious  Affect    normal affect  Perception: within normal limits  Thought Content: within normal limits  Thought Process: logical, coherent and goal-directed  Associations    logical connections  Insight: good  Judgment    Intact  Appetite normal  Sleep disturbance Yes  Fatigue Yes  Loss of pleasure No  Impulsive behavior No  Morbid Ideation: no   Suicide Assessment: no suicidal ideation, plan, or intent  Homicidal Ideation: no    History:    Medications:   Current Outpatient Medications   Medication Sig Dispense Refill    predniSONE (DELTASONE) 20 MG tablet 2 TABS BY MOUTH ONCE DAILY FOR 5 DAYS TAKE WITH FOOD, AVOID IBUPROFEN/MOTRIN/ADVIL,NAPROXEN/ALEVE      Spacer/Aero-Holding Chambers (JULIO CESAR DICKENS) [facility] USE 1 TIME PER DAY USE WITH THE ALBUTEROL INHALER      Azelastine HCl 137 MCG/SPRAY SOLN USE 1 SPRAY BY NASAL ROUTE 2 TIMES DAILY USE IN EACH NOSTRIL AS DIRECTED 1 each 3    montelukast (SINGULAIR) 10 MG tablet TAKE 1 TABLET BY MOUTH EVERY DAY AT NIGHT 90 tablet 1    albuterol sulfate HFA (PROVENTIL HFA) 108 (90 Base) MCG/ACT inhaler Inhale 2 puffs into the lungs every 4 hours as needed for Wheezing 1 Inhaler 0     No current facility-administered medications for this visit. Social History:   Social History     Socioeconomic History    Marital status:      Spouse name: Not on file    Number of children: Not on file    Years of education: Not on file    Highest education level: Not on file   Occupational History    Not on file   Tobacco Use    Smoking status: Never    Smokeless tobacco: Never   Vaping Use    Vaping Use: Never used   Substance and Sexual Activity    Alcohol use: Not Currently     Alcohol/week: 0.0 standard drinks     Comment: occasionally    Drug use: No    Sexual activity: Yes     Partners: Female   Other Topics Concern    Not on file   Social History Narrative    Not on file     Social Determinants of Health     Financial Resource Strain: Not on file   Food Insecurity: Not on file   Transportation Needs: Not on file   Physical Activity: Not on file   Stress: Not on file   Social Connections: Not on file   Intimate Partner Violence: Not on file   Housing Stability: Not on file     TOBACCO:   reports that she has never smoked. She has never used smokeless tobacco.  ETOH:   reports that she does not currently use alcohol. Family History:   Family History   Problem Relation Age of Onset    Diabetes Mother     Diabetes Father     Heart Disease Maternal Grandmother     Cancer Maternal Grandfather         colon cancer    Heart Disease Maternal Grandfather          A:  Patient endorses stable mood. Denies SI/HI, with good insight and motivation. Diagnosis:    1.  Anxiety          Past Diagnosis:        Diagnosis Date    Asthma     asthmatic bronchitis treatment.   not dx asthma    Autism     Depression affecting pregnancy 12/6/2021    Generalized headaches          Plan:  Patient interventions:  Trained in strategies for increasing balanced thinking  Supportive techniques  Discussed healthy expression of anger, including redirecting efforts towards supporting and advocating for women's issues    Patient Behavioral Change Plan:   See Patient Instructions

## 2022-12-29 ENCOUNTER — OFFICE VISIT (OUTPATIENT)
Dept: FAMILY MEDICINE CLINIC | Age: 32
End: 2022-12-29
Payer: COMMERCIAL

## 2022-12-29 VITALS
BODY MASS INDEX: 30.9 KG/M2 | DIASTOLIC BLOOD PRESSURE: 70 MMHG | TEMPERATURE: 97 F | OXYGEN SATURATION: 98 % | SYSTOLIC BLOOD PRESSURE: 110 MMHG | HEART RATE: 92 BPM | RESPIRATION RATE: 16 BRPM | WEIGHT: 153 LBS

## 2022-12-29 DIAGNOSIS — F32.A ANXIETY AND DEPRESSION: Primary | ICD-10-CM

## 2022-12-29 DIAGNOSIS — F41.9 ANXIETY AND DEPRESSION: Primary | ICD-10-CM

## 2022-12-29 PROCEDURE — 99213 OFFICE O/P EST LOW 20 MIN: CPT | Performed by: FAMILY MEDICINE

## 2022-12-29 RX ORDER — SERTRALINE HYDROCHLORIDE 25 MG/1
25 TABLET, FILM COATED ORAL DAILY
Qty: 30 TABLET | Refills: 1 | Status: SHIPPED | OUTPATIENT
Start: 2022-12-29

## 2022-12-29 SDOH — ECONOMIC STABILITY: INCOME INSECURITY: IN THE LAST 12 MONTHS, WAS THERE A TIME WHEN YOU WERE NOT ABLE TO PAY THE MORTGAGE OR RENT ON TIME?: NO

## 2022-12-29 SDOH — ECONOMIC STABILITY: FOOD INSECURITY: WITHIN THE PAST 12 MONTHS, THE FOOD YOU BOUGHT JUST DIDN'T LAST AND YOU DIDN'T HAVE MONEY TO GET MORE.: NEVER TRUE

## 2022-12-29 SDOH — ECONOMIC STABILITY: FOOD INSECURITY: WITHIN THE PAST 12 MONTHS, YOU WORRIED THAT YOUR FOOD WOULD RUN OUT BEFORE YOU GOT MONEY TO BUY MORE.: NEVER TRUE

## 2022-12-29 ASSESSMENT — PATIENT HEALTH QUESTIONNAIRE - PHQ9
5. POOR APPETITE OR OVEREATING: 0
7. TROUBLE CONCENTRATING ON THINGS, SUCH AS READING THE NEWSPAPER OR WATCHING TELEVISION: 0
SUM OF ALL RESPONSES TO PHQ QUESTIONS 1-9: 0
SUM OF ALL RESPONSES TO PHQ9 QUESTIONS 1 & 2: 0
4. FEELING TIRED OR HAVING LITTLE ENERGY: 0
2. FEELING DOWN, DEPRESSED OR HOPELESS: 0
SUM OF ALL RESPONSES TO PHQ QUESTIONS 1-9: 0
1. LITTLE INTEREST OR PLEASURE IN DOING THINGS: 0
6. FEELING BAD ABOUT YOURSELF - OR THAT YOU ARE A FAILURE OR HAVE LET YOURSELF OR YOUR FAMILY DOWN: 0
8. MOVING OR SPEAKING SO SLOWLY THAT OTHER PEOPLE COULD HAVE NOTICED. OR THE OPPOSITE, BEING SO FIGETY OR RESTLESS THAT YOU HAVE BEEN MOVING AROUND A LOT MORE THAN USUAL: 0
SUM OF ALL RESPONSES TO PHQ QUESTIONS 1-9: 0
2. FEELING DOWN, DEPRESSED OR HOPELESS: 0
3. TROUBLE FALLING OR STAYING ASLEEP: 0
SUM OF ALL RESPONSES TO PHQ QUESTIONS 1-9: 0
SUM OF ALL RESPONSES TO PHQ9 QUESTIONS 1 & 2: 0
10. IF YOU CHECKED OFF ANY PROBLEMS, HOW DIFFICULT HAVE THESE PROBLEMS MADE IT FOR YOU TO DO YOUR WORK, TAKE CARE OF THINGS AT HOME, OR GET ALONG WITH OTHER PEOPLE: 0
SUM OF ALL RESPONSES TO PHQ QUESTIONS 1-9: 0
1. LITTLE INTEREST OR PLEASURE IN DOING THINGS: 0
9. THOUGHTS THAT YOU WOULD BE BETTER OFF DEAD, OR OF HURTING YOURSELF: 0
SUM OF ALL RESPONSES TO PHQ QUESTIONS 1-9: 0

## 2022-12-29 ASSESSMENT — ANXIETY QUESTIONNAIRES
5. BEING SO RESTLESS THAT IT IS HARD TO SIT STILL: 0
GAD7 TOTAL SCORE: 7
4. TROUBLE RELAXING: 1
1. FEELING NERVOUS, ANXIOUS, OR ON EDGE: 2
IF YOU CHECKED OFF ANY PROBLEMS ON THIS QUESTIONNAIRE, HOW DIFFICULT HAVE THESE PROBLEMS MADE IT FOR YOU TO DO YOUR WORK, TAKE CARE OF THINGS AT HOME, OR GET ALONG WITH OTHER PEOPLE: SOMEWHAT DIFFICULT
3. WORRYING TOO MUCH ABOUT DIFFERENT THINGS: 1
7. FEELING AFRAID AS IF SOMETHING AWFUL MIGHT HAPPEN: 0
2. NOT BEING ABLE TO STOP OR CONTROL WORRYING: 2
6. BECOMING EASILY ANNOYED OR IRRITABLE: 1

## 2022-12-29 ASSESSMENT — SOCIAL DETERMINANTS OF HEALTH (SDOH): HOW HARD IS IT FOR YOU TO PAY FOR THE VERY BASICS LIKE FOOD, HOUSING, MEDICAL CARE, AND HEATING?: NOT HARD AT ALL

## 2022-12-29 NOTE — PATIENT INSTRUCTIONS
Start the new medication. After 3-4 weeks advise me of  the response.     Call sooner if any questions or issues with the medication

## 2022-12-29 NOTE — PROGRESS NOTES
Subjective:      Patient ID: Michelle Shea is a 34 y.o. y.o. female. Here to follow up her nerves / stress. Is still \"seeing\" Michelle. Says her spouse is being more interactive and helpful with the baby and around the house. Is feeling   HPI      Chief Complaint   Patient presents with    Mental Health Problem     Follow up - feeling very overwhelmed       Allergies   Allergen Reactions    Pertussis Vaccines        Past Medical History:   Diagnosis Date    Asthma     asthmatic bronchitis treatment. not dx asthma    Autism     Depression affecting pregnancy 12/6/2021    Generalized headaches        Past Surgical History:   Procedure Laterality Date    UPPER GASTROINTESTINAL ENDOSCOPY  04/13/2018    Esophagitis    WISDOM TOOTH EXTRACTION         Social History     Socioeconomic History    Marital status:      Spouse name: Not on file    Number of children: Not on file    Years of education: Not on file    Highest education level: Not on file   Occupational History    Not on file   Tobacco Use    Smoking status: Never    Smokeless tobacco: Never   Vaping Use    Vaping Use: Never used   Substance and Sexual Activity    Alcohol use: Not Currently     Alcohol/week: 0.0 standard drinks     Comment: occasionally    Drug use: No    Sexual activity: Yes     Partners: Female   Other Topics Concern    Not on file   Social History Narrative    Not on file     Social Determinants of Health     Financial Resource Strain: Low Risk     Difficulty of Paying Living Expenses: Not hard at all   Food Insecurity: No Food Insecurity    Worried About Running Out of Food in the Last Year: Never true    [address] in the Last Year: Never true   Transportation Needs: No Transportation Needs    Lack of Transportation (Medical): No    Lack of Transportation (Non-Medical):  No   Physical Activity: Not on file   Stress: Not on file   Social Connections: Not on file   Intimate Partner Violence: Not on file   Housing Stability: Unknown    Unable to Pay for Housing in the Last Year: No    Number of Places Lived in the Last Year: Not on file    Unstable Housing in the Last Year: No       Family History   Problem Relation Age of Onset    Diabetes Mother     Diabetes Father     Heart Disease Maternal Grandmother     Cancer Maternal Grandfather         colon cancer    Heart Disease Maternal Grandfather        Vitals:    12/29/22 0943   BP: 110/70   Pulse: 92   Resp: 16   Temp: 97 °F (36.1 °C)   SpO2: 98%       Wt Readings from Last 3 Encounters:   12/29/22 153 lb (69.4 kg)   10/19/22 148 lb (67.1 kg)   09/07/22 146 lb (66.2 kg)       Review of Systems   Constitutional:  Negative for activity change and unexpected weight change. Neurological:  Negative for seizures, facial asymmetry, speech difficulty and numbness. Psychiatric/Behavioral:  Negative for self-injury and suicidal ideas. Anxious sx have been prominrnt. Less of late but not resolved     Objective:   Physical Exam  Constitutional:       Appearance: She is not ill-appearing. HENT:      Left Ear: There is impacted cerumen. Pulmonary:      Effort: Pulmonary effort is normal.   Skin:     General: Skin is warm and dry. Neurological:      General: No focal deficit present. Mental Status: She is alert. Psychiatric:         Behavior: Behavior normal.         Thought Content: Thought content normal.      Comments: Seems a bit edgy,  not as much as before,       Assessment:      Adjustment reaction        Plan:     Discussed meds as before,  says currently is willing to try. Had been reluctant previously. Discussed effects and use and timing. Use with lactation discussed. Possible side effects discussed.     Zoloft trial    Advise me 4 -6 weeks of the response,   call sooner if side effects      Current Outpatient Medications   Medication Sig Dispense Refill    Spacer/Aero-Holding Chambers (JULIO CESAR DICKENS) [facility]ISC USE 1 TIME PER DAY USE WITH THE ALBUTEROL INHALER Azelastine HCl 137 MCG/SPRAY SOLN USE 1 SPRAY BY NASAL ROUTE 2 TIMES DAILY USE IN EACH NOSTRIL AS DIRECTED 1 each 3    montelukast (SINGULAIR) 10 MG tablet TAKE 1 TABLET BY MOUTH EVERY DAY AT NIGHT 90 tablet 1    albuterol sulfate HFA (PROVENTIL HFA) 108 (90 Base) MCG/ACT inhaler Inhale 2 puffs into the lungs every 4 hours as needed for Wheezing 1 Inhaler 0     No current facility-administered medications for this visit.

## 2023-01-23 ENCOUNTER — TELEPHONE (OUTPATIENT)
Dept: PSYCHOLOGY | Age: 33
End: 2023-01-23

## 2023-01-23 NOTE — TELEPHONE ENCOUNTER
Called Pt to follow up on missed virtual visit. Left voicemail message encouraging Pt to call office to reschedule appointment.

## 2023-02-07 ENCOUNTER — TELEPHONE (OUTPATIENT)
Dept: OBGYN CLINIC | Age: 33
End: 2023-02-07

## 2023-02-07 NOTE — TELEPHONE ENCOUNTER
Pt called stating she has signed a release of records form and she only received half of her records. Pt states she would like ALL of her records to be mailed to her home address listed on her chart. Ritu () can you help with this?

## 2023-04-17 ENCOUNTER — OFFICE VISIT (OUTPATIENT)
Dept: FAMILY MEDICINE CLINIC | Age: 33
End: 2023-04-17
Payer: COMMERCIAL

## 2023-04-17 VITALS
WEIGHT: 151 LBS | HEART RATE: 91 BPM | SYSTOLIC BLOOD PRESSURE: 116 MMHG | DIASTOLIC BLOOD PRESSURE: 72 MMHG | RESPIRATION RATE: 18 BRPM | OXYGEN SATURATION: 98 % | BODY MASS INDEX: 30.5 KG/M2

## 2023-04-17 DIAGNOSIS — J02.9 ACUTE PHARYNGITIS, UNSPECIFIED ETIOLOGY: ICD-10-CM

## 2023-04-17 DIAGNOSIS — N94.6 DYSMENORRHEA: Primary | ICD-10-CM

## 2023-04-17 DIAGNOSIS — J30.9 ALLERGIC SINUSITIS: ICD-10-CM

## 2023-04-17 DIAGNOSIS — Z30.011 ENCOUNTER FOR INITIAL PRESCRIPTION OF CONTRACEPTIVE PILLS: ICD-10-CM

## 2023-04-17 DIAGNOSIS — F32.1 CURRENT MODERATE EPISODE OF MAJOR DEPRESSIVE DISORDER, UNSPECIFIED WHETHER RECURRENT (HCC): ICD-10-CM

## 2023-04-17 PROCEDURE — 99214 OFFICE O/P EST MOD 30 MIN: CPT | Performed by: NURSE PRACTITIONER

## 2023-04-17 RX ORDER — ACETAMINOPHEN AND CODEINE PHOSPHATE 120; 12 MG/5ML; MG/5ML
1 SOLUTION ORAL DAILY
Qty: 28 TABLET | Refills: 5 | Status: SHIPPED | OUTPATIENT
Start: 2023-04-17

## 2023-04-17 RX ORDER — FLUTICASONE PROPIONATE 50 MCG
SPRAY, SUSPENSION (ML) NASAL
Qty: 16 G | Refills: 2 | Status: SHIPPED | OUTPATIENT
Start: 2023-04-17

## 2023-04-17 RX ORDER — MONTELUKAST SODIUM 10 MG/1
TABLET ORAL
Qty: 90 TABLET | Refills: 0 | Status: SHIPPED | OUTPATIENT
Start: 2023-04-17

## 2023-04-17 ASSESSMENT — ENCOUNTER SYMPTOMS
VOICE CHANGE: 0
TROUBLE SWALLOWING: 0
RESPIRATORY NEGATIVE: 1
GASTROINTESTINAL NEGATIVE: 1
SORE THROAT: 1
SINUS PRESSURE: 0

## 2023-04-17 NOTE — PROGRESS NOTES
2023    This is a 28 y.o. female   Chief Complaint   Patient presents with    Contraception     Wants to discuss birth control   . Luana Chilel is seen today to discuss birth control. She was previously on depo and several ocp, but did not like the side effects. she did like Geeta. She notes heavy cycles with some clots. She states she does not want a new gynecologist because of past experiences. Menses last 7 days and the flow is heavy. She states she usees 6-7 pads, there are times that she will saturate a pad an hour on day three for about 1/2 that day then it slow down. She has some issues with cramping uses ibuprofen which helps some. Her Pap was last done in . Anxiety and depression: She continues to struggle with anxiety and depression. She again states \"she does not have closure\" from the things that happen with a gynecologist.  She did find counseling helpful but when things \"did not go anywhere\". She cannot give up hope. She is not taking the sertraline because she states\" I do not know what the benefit would be\". She states that she does not feel she got the support that she needs and is still struggling with things at home. She would not elaborate further. Pharyngitis: sore throat x 2 days. No feverls or chills. She does note some allergy symptoms and postnasal drip. She is not currently taking any allergy medications. No nausea vomiting or diarrhea. No cough congestion or wheezing.        Patient Active Problem List   Diagnosis    Dyspepsia    RUQ pain    Prenatal care in third trimester    Anxiety during pregnancy    Anxiety about health    Round ligament pain    Depression affecting pregnancy    Back pain in pregnancy    Nausea and vomiting in pregnancy    Non-reassuring electronic fetal monitoring tracing    Normal labor     (normal spontaneous vaginal delivery)    Postpartum care and examination immediately after delivery       Current Outpatient Medications   Medication

## 2023-04-17 NOTE — PATIENT INSTRUCTIONS
Scheduled ibuprofen three times a day for 3 days with food  Start allergy medications  Start flonase  Push fluids  Mild salt water gargles 4 times a day    Start birth control on first day of next cycle.   Schedule well woman in 3 months

## 2023-04-18 RX ORDER — SERTRALINE HYDROCHLORIDE 25 MG/1
25 TABLET, FILM COATED ORAL DAILY
Qty: 30 TABLET | Refills: 1 | Status: SHIPPED | OUTPATIENT
Start: 2023-04-18 | End: 2023-04-19

## 2023-04-18 NOTE — TELEPHONE ENCOUNTER
Future Appointments   Date Time Provider Shea Garcia   5/1/2023  8:00 AM Cristian Arita [facility]   7/26/2023  8:40 AM Tra , APRN - CNP [facility] [place] [facility]     LOV 4/17/2023

## 2023-04-19 ENCOUNTER — PATIENT MESSAGE (OUTPATIENT)
Dept: FAMILY MEDICINE CLINIC | Age: 33
End: 2023-04-19

## 2023-04-19 RX ORDER — SERTRALINE HYDROCHLORIDE 25 MG/1
25 TABLET, FILM COATED ORAL DAILY
Qty: 90 TABLET | Refills: 1 | Status: SHIPPED | OUTPATIENT
Start: 2023-04-19

## 2023-04-19 NOTE — TELEPHONE ENCOUNTER
Future Appointments   Date Time Provider Shea Garcia   5/1/2023  8:00 AM Althia Cascade [facility]   7/26/2023  8:40 AM SUZETTE Rice CNP 4/17/2023    Rx was sent yesterday  Pharm requesting 90 day supply

## 2023-05-01 ENCOUNTER — TELEMEDICINE (OUTPATIENT)
Dept: PSYCHOLOGY | Age: 33
End: 2023-05-01
Payer: COMMERCIAL

## 2023-05-01 DIAGNOSIS — F41.9 ANXIETY: Primary | ICD-10-CM

## 2023-05-01 PROCEDURE — 90834 PSYTX W PT 45 MINUTES: CPT | Performed by: SOCIAL WORKER

## 2023-05-01 NOTE — PATIENT INSTRUCTIONS
Send message to PCP and ask for a potential referral or referrals to OB/GYN. Ask if they know of someone who is a good listener, and spends time with the patient. If they sends names, google them.

## 2023-06-05 ENCOUNTER — TELEMEDICINE (OUTPATIENT)
Dept: PSYCHOLOGY | Age: 33
End: 2023-06-05
Payer: COMMERCIAL

## 2023-06-05 DIAGNOSIS — F41.9 ANXIETY: Primary | ICD-10-CM

## 2023-06-05 PROCEDURE — 90832 PSYTX W PT 30 MINUTES: CPT | Performed by: SOCIAL WORKER

## 2023-06-05 NOTE — PATIENT INSTRUCTIONS
Referral to specialty mental health: [facility]: [email or web address]  [employer], [place]: [phone], [email or web address],   Yadiel. [facility]

## 2023-06-26 ENCOUNTER — TELEPHONE (OUTPATIENT)
Dept: PSYCHOLOGY | Age: 33
End: 2023-06-26

## 2023-07-08 DIAGNOSIS — R30.0 DYSURIA: ICD-10-CM

## 2023-07-10 RX ORDER — MONTELUKAST SODIUM 10 MG/1
TABLET ORAL
Qty: 90 TABLET | Refills: 0 | Status: SHIPPED | OUTPATIENT
Start: 2023-07-10

## 2023-07-10 NOTE — TELEPHONE ENCOUNTER
Future Appointments   Date Time Provider [address]   7/26/2023  8:40 AM SUZETTE Cortes - CNP [facility] [place] [facility]     LOV 4/17/2023

## 2023-07-26 ENCOUNTER — OFFICE VISIT (OUTPATIENT)
Dept: FAMILY MEDICINE CLINIC | Age: 33
End: 2023-07-26
Payer: COMMERCIAL

## 2023-07-26 VITALS
DIASTOLIC BLOOD PRESSURE: 76 MMHG | TEMPERATURE: 97.1 F | BODY MASS INDEX: 30.6 KG/M2 | OXYGEN SATURATION: 98 % | SYSTOLIC BLOOD PRESSURE: 132 MMHG | HEIGHT: 59 IN | HEART RATE: 91 BPM

## 2023-07-26 DIAGNOSIS — N89.8 VAGINAL DISCHARGE: ICD-10-CM

## 2023-07-26 DIAGNOSIS — R10.2 VAGINAL PAIN: ICD-10-CM

## 2023-07-26 DIAGNOSIS — Z12.4 SCREENING FOR CERVICAL CANCER: Primary | ICD-10-CM

## 2023-07-26 DIAGNOSIS — F41.9 ANXIETY: ICD-10-CM

## 2023-07-26 DIAGNOSIS — Z01.419 WELL WOMAN EXAM: ICD-10-CM

## 2023-07-26 PROCEDURE — 99395 PREV VISIT EST AGE 18-39: CPT | Performed by: NURSE PRACTITIONER

## 2023-07-26 SDOH — ECONOMIC STABILITY: FOOD INSECURITY: WITHIN THE PAST 12 MONTHS, THE FOOD YOU BOUGHT JUST DIDN'T LAST AND YOU DIDN'T HAVE MONEY TO GET MORE.: NEVER TRUE

## 2023-07-26 SDOH — ECONOMIC STABILITY: INCOME INSECURITY: HOW HARD IS IT FOR YOU TO PAY FOR THE VERY BASICS LIKE FOOD, HOUSING, MEDICAL CARE, AND HEATING?: NOT HARD AT ALL

## 2023-07-26 SDOH — ECONOMIC STABILITY: FOOD INSECURITY: WITHIN THE PAST 12 MONTHS, YOU WORRIED THAT YOUR FOOD WOULD RUN OUT BEFORE YOU GOT MONEY TO BUY MORE.: NEVER TRUE

## 2023-07-26 ASSESSMENT — ENCOUNTER SYMPTOMS
GASTROINTESTINAL NEGATIVE: 1
RESPIRATORY NEGATIVE: 1

## 2023-07-26 ASSESSMENT — PATIENT HEALTH QUESTIONNAIRE - PHQ9
SUM OF ALL RESPONSES TO PHQ9 QUESTIONS 1 & 2: 1
SUM OF ALL RESPONSES TO PHQ QUESTIONS 1-9: 10
7. TROUBLE CONCENTRATING ON THINGS, SUCH AS READING THE NEWSPAPER OR WATCHING TELEVISION: 2
5. POOR APPETITE OR OVEREATING: 2
8. MOVING OR SPEAKING SO SLOWLY THAT OTHER PEOPLE COULD HAVE NOTICED. OR THE OPPOSITE, BEING SO FIGETY OR RESTLESS THAT YOU HAVE BEEN MOVING AROUND A LOT MORE THAN USUAL: 0
3. TROUBLE FALLING OR STAYING ASLEEP: 2
9. THOUGHTS THAT YOU WOULD BE BETTER OFF DEAD, OR OF HURTING YOURSELF: 0
SUM OF ALL RESPONSES TO PHQ QUESTIONS 1-9: 10
1. LITTLE INTEREST OR PLEASURE IN DOING THINGS: 0
6. FEELING BAD ABOUT YOURSELF - OR THAT YOU ARE A FAILURE OR HAVE LET YOURSELF OR YOUR FAMILY DOWN: 1
10. IF YOU CHECKED OFF ANY PROBLEMS, HOW DIFFICULT HAVE THESE PROBLEMS MADE IT FOR YOU TO DO YOUR WORK, TAKE CARE OF THINGS AT HOME, OR GET ALONG WITH OTHER PEOPLE: 1
4. FEELING TIRED OR HAVING LITTLE ENERGY: 2
2. FEELING DOWN, DEPRESSED OR HOPELESS: 1

## 2023-07-26 ASSESSMENT — ANXIETY QUESTIONNAIRES
3. WORRYING TOO MUCH ABOUT DIFFERENT THINGS: 2
6. BECOMING EASILY ANNOYED OR IRRITABLE: 2
7. FEELING AFRAID AS IF SOMETHING AWFUL MIGHT HAPPEN: 2
4. TROUBLE RELAXING: 2
IF YOU CHECKED OFF ANY PROBLEMS ON THIS QUESTIONNAIRE, HOW DIFFICULT HAVE THESE PROBLEMS MADE IT FOR YOU TO DO YOUR WORK, TAKE CARE OF THINGS AT HOME, OR GET ALONG WITH OTHER PEOPLE: SOMEWHAT DIFFICULT
5. BEING SO RESTLESS THAT IT IS HARD TO SIT STILL: 2
2. NOT BEING ABLE TO STOP OR CONTROL WORRYING: 2
1. FEELING NERVOUS, ANXIOUS, OR ON EDGE: 2
GAD7 TOTAL SCORE: 14

## 2023-07-27 DIAGNOSIS — B96.89 BV (BACTERIAL VAGINOSIS): Primary | ICD-10-CM

## 2023-07-27 DIAGNOSIS — N76.0 BV (BACTERIAL VAGINOSIS): Primary | ICD-10-CM

## 2023-07-27 LAB
CANDIDA DNA VAG QL NAA+PROBE: ABNORMAL
G VAGINALIS DNA SPEC QL NAA+PROBE: ABNORMAL
T VAGINALIS DNA VAG QL NAA+PROBE: ABNORMAL

## 2023-07-27 RX ORDER — METRONIDAZOLE 500 MG/1
500 TABLET ORAL 2 TIMES DAILY
Qty: 14 TABLET | Refills: 0 | Status: SHIPPED | OUTPATIENT
Start: 2023-07-27 | End: 2023-08-03

## 2023-09-27 ENCOUNTER — TELEPHONE (OUTPATIENT)
Dept: FAMILY MEDICINE CLINIC | Age: 33
End: 2023-09-27

## 2023-10-11 DIAGNOSIS — R30.0 DYSURIA: ICD-10-CM

## 2023-10-11 RX ORDER — MONTELUKAST SODIUM 10 MG/1
TABLET ORAL
Qty: 90 TABLET | Refills: 0 | Status: SHIPPED | OUTPATIENT
Start: 2023-10-11

## 2023-10-27 DIAGNOSIS — F41.9 ANXIETY: ICD-10-CM

## 2023-11-21 DIAGNOSIS — N94.6 DYSMENORRHEA: ICD-10-CM

## 2023-11-21 DIAGNOSIS — Z30.011 ENCOUNTER FOR INITIAL PRESCRIPTION OF CONTRACEPTIVE PILLS: ICD-10-CM

## 2023-11-21 DIAGNOSIS — F41.9 ANXIETY: ICD-10-CM

## 2023-11-22 RX ORDER — ACETAMINOPHEN AND CODEINE PHOSPHATE 120; 12 MG/5ML; MG/5ML
1 SOLUTION ORAL DAILY
Qty: 28 TABLET | Refills: 5 | Status: SHIPPED | OUTPATIENT
Start: 2023-11-22

## 2024-06-27 DIAGNOSIS — N94.6 DYSMENORRHEA: ICD-10-CM

## 2024-06-27 DIAGNOSIS — Z30.011 ENCOUNTER FOR INITIAL PRESCRIPTION OF CONTRACEPTIVE PILLS: ICD-10-CM

## 2024-06-27 RX ORDER — ACETAMINOPHEN AND CODEINE PHOSPHATE 120; 12 MG/5ML; MG/5ML
1 SOLUTION ORAL DAILY
Qty: 28 TABLET | Refills: 1 | Status: SHIPPED | OUTPATIENT
Start: 2024-06-27

## 2024-06-27 NOTE — TELEPHONE ENCOUNTER
Please tell her I refilled the prescription for Dr Chappell. She is due this summer for a follow up appointment

## 2024-08-27 DIAGNOSIS — Z30.011 ENCOUNTER FOR INITIAL PRESCRIPTION OF CONTRACEPTIVE PILLS: ICD-10-CM

## 2024-08-27 DIAGNOSIS — N94.6 DYSMENORRHEA: ICD-10-CM

## 2024-08-28 RX ORDER — ACETAMINOPHEN AND CODEINE PHOSPHATE 120; 12 MG/5ML; MG/5ML
1 SOLUTION ORAL DAILY
Qty: 28 TABLET | Refills: 0 | Status: SHIPPED | OUTPATIENT
Start: 2024-08-28

## 2024-12-02 DIAGNOSIS — Z30.011 ENCOUNTER FOR INITIAL PRESCRIPTION OF CONTRACEPTIVE PILLS: ICD-10-CM

## 2024-12-02 DIAGNOSIS — N94.6 DYSMENORRHEA: ICD-10-CM

## 2024-12-02 RX ORDER — ACETAMINOPHEN AND CODEINE PHOSPHATE 120; 12 MG/5ML; MG/5ML
1 SOLUTION ORAL DAILY
Qty: 28 TABLET | Refills: 0 | OUTPATIENT
Start: 2024-12-02

## 2025-01-03 DIAGNOSIS — Z30.011 ENCOUNTER FOR INITIAL PRESCRIPTION OF CONTRACEPTIVE PILLS: ICD-10-CM

## 2025-01-03 DIAGNOSIS — N94.6 DYSMENORRHEA: ICD-10-CM

## 2025-01-09 RX ORDER — ACETAMINOPHEN AND CODEINE PHOSPHATE 120; 12 MG/5ML; MG/5ML
1 SOLUTION ORAL DAILY
Qty: 28 TABLET | Refills: 5 | Status: SHIPPED | OUTPATIENT
Start: 2025-01-09

## 2025-06-29 DIAGNOSIS — Z30.011 ENCOUNTER FOR INITIAL PRESCRIPTION OF CONTRACEPTIVE PILLS: ICD-10-CM

## 2025-06-29 DIAGNOSIS — N94.6 DYSMENORRHEA: ICD-10-CM

## 2025-06-30 RX ORDER — NORETHINDRONE 0.35 MG/1
1 TABLET ORAL DAILY
Qty: 84 TABLET | Refills: 1 | OUTPATIENT
Start: 2025-06-30